# Patient Record
Sex: MALE | Race: WHITE | NOT HISPANIC OR LATINO | URBAN - METROPOLITAN AREA
[De-identification: names, ages, dates, MRNs, and addresses within clinical notes are randomized per-mention and may not be internally consistent; named-entity substitution may affect disease eponyms.]

---

## 2021-01-01 ENCOUNTER — EMERGENCY (EMERGENCY)
Facility: HOSPITAL | Age: 86
LOS: 1 days | Discharge: HOME | End: 2021-01-01
Attending: EMERGENCY MEDICINE | Admitting: EMERGENCY MEDICINE
Payer: MEDICARE

## 2021-01-01 VITALS
DIASTOLIC BLOOD PRESSURE: 56 MMHG | TEMPERATURE: 98 F | WEIGHT: 160.06 LBS | HEIGHT: 73 IN | RESPIRATION RATE: 18 BRPM | SYSTOLIC BLOOD PRESSURE: 90 MMHG | HEART RATE: 77 BPM

## 2021-01-01 VITALS
SYSTOLIC BLOOD PRESSURE: 109 MMHG | HEART RATE: 80 BPM | DIASTOLIC BLOOD PRESSURE: 66 MMHG | RESPIRATION RATE: 16 BRPM | OXYGEN SATURATION: 98 %

## 2021-01-01 DIAGNOSIS — Z90.49 ACQUIRED ABSENCE OF OTHER SPECIFIED PARTS OF DIGESTIVE TRACT: Chronic | ICD-10-CM

## 2021-01-01 DIAGNOSIS — Z90.89 ACQUIRED ABSENCE OF OTHER ORGANS: Chronic | ICD-10-CM

## 2021-01-01 PROCEDURE — 99285 EMERGENCY DEPT VISIT HI MDM: CPT

## 2021-01-01 PROCEDURE — 70450 CT HEAD/BRAIN W/O DYE: CPT | Mod: MG

## 2021-01-01 PROCEDURE — 71250 CT THORAX DX C-: CPT | Mod: 26,MF

## 2021-01-01 PROCEDURE — G1004: CPT

## 2021-01-01 PROCEDURE — 72125 CT NECK SPINE W/O DYE: CPT | Mod: 26,MG

## 2021-01-01 PROCEDURE — 71250 CT THORAX DX C-: CPT | Mod: MF

## 2021-01-01 PROCEDURE — 99284 EMERGENCY DEPT VISIT MOD MDM: CPT | Mod: 25

## 2021-01-01 PROCEDURE — 72125 CT NECK SPINE W/O DYE: CPT | Mod: MG

## 2021-01-01 PROCEDURE — 70450 CT HEAD/BRAIN W/O DYE: CPT | Mod: 26,MG

## 2021-01-01 RX ORDER — ACETAMINOPHEN 500 MG
650 TABLET ORAL ONCE
Refills: 0 | Status: COMPLETED | OUTPATIENT
Start: 2021-01-01 | End: 2021-01-01

## 2021-01-01 RX ADMIN — Medication 650 MILLIGRAM(S): at 16:44

## 2021-08-12 ENCOUNTER — EMERGENCY (EMERGENCY)
Facility: HOSPITAL | Age: 86
LOS: 1 days | Discharge: HOME | End: 2021-08-12
Attending: EMERGENCY MEDICINE | Admitting: EMERGENCY MEDICINE
Payer: MEDICARE

## 2021-08-12 VITALS
HEIGHT: 73 IN | DIASTOLIC BLOOD PRESSURE: 55 MMHG | WEIGHT: 119.93 LBS | OXYGEN SATURATION: 97 % | TEMPERATURE: 98 F | RESPIRATION RATE: 18 BRPM | HEART RATE: 70 BPM | SYSTOLIC BLOOD PRESSURE: 90 MMHG

## 2021-08-12 DIAGNOSIS — Z90.49 ACQUIRED ABSENCE OF OTHER SPECIFIED PARTS OF DIGESTIVE TRACT: Chronic | ICD-10-CM

## 2021-08-12 DIAGNOSIS — Z90.89 ACQUIRED ABSENCE OF OTHER ORGANS: Chronic | ICD-10-CM

## 2021-08-12 LAB
ALBUMIN SERPL ELPH-MCNC: 3.4 G/DL — SIGNIFICANT CHANGE UP (ref 3.3–5)
ALP SERPL-CCNC: 61 U/L — SIGNIFICANT CHANGE UP (ref 40–120)
ALT FLD-CCNC: 18 U/L — SIGNIFICANT CHANGE UP (ref 12–78)
ANION GAP SERPL CALC-SCNC: 4 MMOL/L — LOW (ref 5–17)
ANISOCYTOSIS BLD QL: SLIGHT — SIGNIFICANT CHANGE UP
APTT BLD: 28.1 SEC — SIGNIFICANT CHANGE UP (ref 27.5–35.5)
AST SERPL-CCNC: 12 U/L — LOW (ref 15–37)
BASO STIPL BLD QL SMEAR: PRESENT — SIGNIFICANT CHANGE UP
BASOPHILS # BLD AUTO: 0.06 K/UL — SIGNIFICANT CHANGE UP (ref 0–0.2)
BASOPHILS NFR BLD AUTO: 0.4 % — SIGNIFICANT CHANGE UP (ref 0–2)
BILIRUB SERPL-MCNC: 1.2 MG/DL — SIGNIFICANT CHANGE UP (ref 0.2–1.2)
BUN SERPL-MCNC: 32 MG/DL — HIGH (ref 7–23)
CALCIUM SERPL-MCNC: 8.4 MG/DL — LOW (ref 8.5–10.1)
CHLORIDE SERPL-SCNC: 110 MMOL/L — HIGH (ref 96–108)
CO2 SERPL-SCNC: 29 MMOL/L — SIGNIFICANT CHANGE UP (ref 22–31)
CREAT SERPL-MCNC: 1.4 MG/DL — HIGH (ref 0.5–1.3)
EOSINOPHIL # BLD AUTO: 0.23 K/UL — SIGNIFICANT CHANGE UP (ref 0–0.5)
EOSINOPHIL NFR BLD AUTO: 1.5 % — SIGNIFICANT CHANGE UP (ref 0–6)
GLUCOSE SERPL-MCNC: 87 MG/DL — SIGNIFICANT CHANGE UP (ref 70–99)
HCT VFR BLD CALC: 24.6 % — LOW (ref 39–50)
HGB BLD-MCNC: 8.3 G/DL — LOW (ref 13–17)
IMM GRANULOCYTES NFR BLD AUTO: 0.5 % — SIGNIFICANT CHANGE UP (ref 0–1.5)
INR BLD: 1.15 RATIO — SIGNIFICANT CHANGE UP (ref 0.88–1.16)
LACTATE SERPL-SCNC: 0.9 MMOL/L — SIGNIFICANT CHANGE UP (ref 0.7–2)
LIDOCAIN IGE QN: 107 U/L — SIGNIFICANT CHANGE UP (ref 73–393)
LYMPHOCYTES # BLD AUTO: 1.8 K/UL — SIGNIFICANT CHANGE UP (ref 1–3.3)
LYMPHOCYTES # BLD AUTO: 12.1 % — LOW (ref 13–44)
MACROCYTES BLD QL: SLIGHT — SIGNIFICANT CHANGE UP
MAGNESIUM SERPL-MCNC: 2.7 MG/DL — HIGH (ref 1.6–2.6)
MANUAL SMEAR VERIFICATION: SIGNIFICANT CHANGE UP
MCHC RBC-ENTMCNC: 33.7 GM/DL — SIGNIFICANT CHANGE UP (ref 32–36)
MCHC RBC-ENTMCNC: 36.9 PG — HIGH (ref 27–34)
MCV RBC AUTO: 109.3 FL — HIGH (ref 80–100)
MONOCYTES # BLD AUTO: 1.09 K/UL — HIGH (ref 0–0.9)
MONOCYTES NFR BLD AUTO: 7.3 % — SIGNIFICANT CHANGE UP (ref 2–14)
NEUTROPHILS # BLD AUTO: 11.64 K/UL — HIGH (ref 1.8–7.4)
NEUTROPHILS NFR BLD AUTO: 78.2 % — HIGH (ref 43–77)
NRBC # BLD: 0 /100 WBCS — SIGNIFICANT CHANGE UP (ref 0–0)
PLAT MORPH BLD: NORMAL — SIGNIFICANT CHANGE UP
PLATELET # BLD AUTO: 294 K/UL — SIGNIFICANT CHANGE UP (ref 150–400)
POTASSIUM SERPL-MCNC: 4.5 MMOL/L — SIGNIFICANT CHANGE UP (ref 3.5–5.3)
POTASSIUM SERPL-SCNC: 4.5 MMOL/L — SIGNIFICANT CHANGE UP (ref 3.5–5.3)
PROT SERPL-MCNC: 6.4 G/DL — SIGNIFICANT CHANGE UP (ref 6–8.3)
PROTHROM AB SERPL-ACNC: 13.4 SEC — SIGNIFICANT CHANGE UP (ref 10.6–13.6)
RBC # BLD: 2.25 M/UL — LOW (ref 4.2–5.8)
RBC # FLD: 15.1 % — HIGH (ref 10.3–14.5)
RBC BLD AUTO: ABNORMAL
ROULEAUX BLD QL SMEAR: PRESENT
SARS-COV-2 RNA SPEC QL NAA+PROBE: SIGNIFICANT CHANGE UP
SODIUM SERPL-SCNC: 143 MMOL/L — SIGNIFICANT CHANGE UP (ref 135–145)
WBC # BLD: 14.9 K/UL — HIGH (ref 3.8–10.5)
WBC # FLD AUTO: 14.9 K/UL — HIGH (ref 3.8–10.5)

## 2021-08-12 PROCEDURE — 74177 CT ABD & PELVIS W/CONTRAST: CPT | Mod: 26,MA

## 2021-08-12 PROCEDURE — 93010 ELECTROCARDIOGRAM REPORT: CPT

## 2021-08-12 PROCEDURE — 71045 X-RAY EXAM CHEST 1 VIEW: CPT | Mod: 26

## 2021-08-12 PROCEDURE — 99285 EMERGENCY DEPT VISIT HI MDM: CPT

## 2021-08-12 RX ORDER — FAMOTIDINE 10 MG/ML
20 INJECTION INTRAVENOUS ONCE
Refills: 0 | Status: COMPLETED | OUTPATIENT
Start: 2021-08-12 | End: 2021-08-12

## 2021-08-12 RX ORDER — ONDANSETRON 8 MG/1
4 TABLET, FILM COATED ORAL ONCE
Refills: 0 | Status: COMPLETED | OUTPATIENT
Start: 2021-08-12 | End: 2021-08-12

## 2021-08-12 RX ORDER — SODIUM CHLORIDE 9 MG/ML
1000 INJECTION INTRAMUSCULAR; INTRAVENOUS; SUBCUTANEOUS ONCE
Refills: 0 | Status: COMPLETED | OUTPATIENT
Start: 2021-08-12 | End: 2021-08-12

## 2021-08-12 RX ADMIN — SODIUM CHLORIDE 1000 MILLILITER(S): 9 INJECTION INTRAMUSCULAR; INTRAVENOUS; SUBCUTANEOUS at 22:50

## 2021-08-12 RX ADMIN — SODIUM CHLORIDE 1000 MILLILITER(S): 9 INJECTION INTRAMUSCULAR; INTRAVENOUS; SUBCUTANEOUS at 21:50

## 2021-08-12 RX ADMIN — FAMOTIDINE 20 MILLIGRAM(S): 10 INJECTION INTRAVENOUS at 21:50

## 2021-08-12 RX ADMIN — ONDANSETRON 4 MILLIGRAM(S): 8 TABLET, FILM COATED ORAL at 21:50

## 2021-08-12 NOTE — ED ADULT NURSE REASSESSMENT NOTE - NS ED NURSE REASSESS COMMENT FT1
Patient A/Ox4. NAD noted. Resting in stretcher. Call light in reach. Telemetry monitor on. C/o multiple vomiting episodes. Denies fever, diarrhea or hematemesis. Patient A/Ox4. NAD noted. Resting in stretcher. Call light in reach. Telemetry monitor on. C/o multiple vomiting episodes. Denies fever, diarrhea or hematemesis. Call light in reach. Patient A/Ox4. NAD noted. Resting in stretcher. Call light in reach. Telemetry monitor on. C/o multiple vomiting episodes. Denies fever, diarrhea or hematemesis. Call light in reach. Urine cup at bedside. Awaiting sample.

## 2021-08-12 NOTE — ED ADULT NURSE NOTE - OBJECTIVE STATEMENT
Pt received sitting on stretcher in NAD. Pt AOx3 abdominal pain today had vomited 3 times this week and had diarrhea today. Neuro WNL. PERRLA. Lungs CTA, RR even unlabored. Ab soft non tender, + bowel sounds x 4quads. Skin warm, dry, color appropriate for age and race.

## 2021-08-12 NOTE — ED ADULT NURSE NOTE - NSICDXPASTSURGICALHX_GEN_ALL_CORE_FT
PAST SURGICAL HISTORY:  History of appendectomy     History of cholecystectomy     History of tonsillectomy

## 2021-08-12 NOTE — ED ADULT TRIAGE NOTE - CHIEF COMPLAINT QUOTE
pt cant digest food he keeps vomiting (1 week intermittant)  diarrhea (intermittant)pt also with mucous in nose

## 2021-08-13 VITALS
RESPIRATION RATE: 18 BRPM | TEMPERATURE: 98 F | OXYGEN SATURATION: 99 % | HEART RATE: 75 BPM | DIASTOLIC BLOOD PRESSURE: 72 MMHG | SYSTOLIC BLOOD PRESSURE: 117 MMHG

## 2021-08-13 LAB
APPEARANCE UR: CLEAR — SIGNIFICANT CHANGE UP
BACTERIA # UR AUTO: ABNORMAL
BILIRUB UR-MCNC: NEGATIVE — SIGNIFICANT CHANGE UP
COD CRY URNS QL: ABNORMAL
COLOR SPEC: YELLOW — SIGNIFICANT CHANGE UP
COMMENT - URINE: SIGNIFICANT CHANGE UP
DIFF PNL FLD: NEGATIVE — SIGNIFICANT CHANGE UP
EPI CELLS # UR: SIGNIFICANT CHANGE UP
GLUCOSE UR QL: NEGATIVE — SIGNIFICANT CHANGE UP
HYALINE CASTS # UR AUTO: ABNORMAL
KETONES UR-MCNC: NEGATIVE — SIGNIFICANT CHANGE UP
LEUKOCYTE ESTERASE UR-ACNC: NEGATIVE — SIGNIFICANT CHANGE UP
NITRITE UR-MCNC: NEGATIVE — SIGNIFICANT CHANGE UP
PH UR: 5 — SIGNIFICANT CHANGE UP (ref 5–8)
PROT UR-MCNC: 15
RBC CASTS # UR COMP ASSIST: SIGNIFICANT CHANGE UP /HPF (ref 0–4)
SP GR SPEC: 1.01 — SIGNIFICANT CHANGE UP (ref 1.01–1.02)
UROBILINOGEN FLD QL: 1
WBC UR QL: SIGNIFICANT CHANGE UP

## 2021-08-13 PROCEDURE — 87635 SARS-COV-2 COVID-19 AMP PRB: CPT

## 2021-08-13 PROCEDURE — 74177 CT ABD & PELVIS W/CONTRAST: CPT | Mod: MA

## 2021-08-13 PROCEDURE — 87086 URINE CULTURE/COLONY COUNT: CPT

## 2021-08-13 PROCEDURE — 83735 ASSAY OF MAGNESIUM: CPT

## 2021-08-13 PROCEDURE — 96361 HYDRATE IV INFUSION ADD-ON: CPT

## 2021-08-13 PROCEDURE — 83690 ASSAY OF LIPASE: CPT

## 2021-08-13 PROCEDURE — 83605 ASSAY OF LACTIC ACID: CPT

## 2021-08-13 PROCEDURE — 80053 COMPREHEN METABOLIC PANEL: CPT

## 2021-08-13 PROCEDURE — 93005 ELECTROCARDIOGRAM TRACING: CPT

## 2021-08-13 PROCEDURE — 85730 THROMBOPLASTIN TIME PARTIAL: CPT

## 2021-08-13 PROCEDURE — 96375 TX/PRO/DX INJ NEW DRUG ADDON: CPT

## 2021-08-13 PROCEDURE — 81001 URINALYSIS AUTO W/SCOPE: CPT

## 2021-08-13 PROCEDURE — 36415 COLL VENOUS BLD VENIPUNCTURE: CPT

## 2021-08-13 PROCEDURE — 96365 THER/PROPH/DIAG IV INF INIT: CPT | Mod: XU

## 2021-08-13 PROCEDURE — 85610 PROTHROMBIN TIME: CPT

## 2021-08-13 PROCEDURE — 99284 EMERGENCY DEPT VISIT MOD MDM: CPT | Mod: 25

## 2021-08-13 PROCEDURE — 85025 COMPLETE CBC W/AUTO DIFF WBC: CPT

## 2021-08-13 PROCEDURE — 71045 X-RAY EXAM CHEST 1 VIEW: CPT

## 2021-08-13 RX ORDER — PIPERACILLIN AND TAZOBACTAM 4; .5 G/20ML; G/20ML
3.38 INJECTION, POWDER, LYOPHILIZED, FOR SOLUTION INTRAVENOUS ONCE
Refills: 0 | Status: COMPLETED | OUTPATIENT
Start: 2021-08-13 | End: 2021-08-13

## 2021-08-13 RX ORDER — SODIUM CHLORIDE 9 MG/ML
1000 INJECTION INTRAMUSCULAR; INTRAVENOUS; SUBCUTANEOUS ONCE
Refills: 0 | Status: COMPLETED | OUTPATIENT
Start: 2021-08-13 | End: 2021-08-13

## 2021-08-13 RX ADMIN — SODIUM CHLORIDE 1000 MILLILITER(S): 9 INJECTION INTRAMUSCULAR; INTRAVENOUS; SUBCUTANEOUS at 00:28

## 2021-08-13 RX ADMIN — PIPERACILLIN AND TAZOBACTAM 200 GRAM(S): 4; .5 INJECTION, POWDER, LYOPHILIZED, FOR SOLUTION INTRAVENOUS at 00:25

## 2021-08-13 RX ADMIN — PIPERACILLIN AND TAZOBACTAM 3.38 GRAM(S): 4; .5 INJECTION, POWDER, LYOPHILIZED, FOR SOLUTION INTRAVENOUS at 00:55

## 2021-08-13 RX ADMIN — SODIUM CHLORIDE 1000 MILLILITER(S): 9 INJECTION INTRAMUSCULAR; INTRAVENOUS; SUBCUTANEOUS at 01:28

## 2021-08-13 NOTE — ED PROVIDER NOTE - PROGRESS NOTE DETAILS
patient feels well, abdomen soft, non tender, wants to go home, spoke with daughter Sudhir [phone], labs, ct results discussed, patient currently in no pain, afebrile, to get antibiotics, dc home, f/u as outpatient next week with PMD, instructed to return to ER for fever, severe pain or worsning of symptoms, patient normally unable to ambulate, to call for ambulance for transfer home

## 2021-08-13 NOTE — ED PROVIDER NOTE - NSFOLLOWUPINSTRUCTIONS_ED_ALL_ED_FT
Follow up with your primary care doctor. Return to ER for severe pain, prolonged vomiting, fever > 100.4F or worsening of symptoms. Start a liquid diet for the next 24 hours.    WHAT YOU NEED TO KNOW:    A clear liquid diet is made up of clear liquids and foods that are liquid at room temperature. The clear liquid diet provides liquids, sugar, salt, and some nutrients until you can eat solid food. The clear liquid diet does not provide all the nutrients, vitamins, minerals, or calories that your body needs. Your healthcare provider will tell you when you can start to eat regular foods.     DISCHARGE INSTRUCTIONS:    Liquids and foods to include:   •Clear juices (such as apple, cranberry, or grape), strained citrus juices or fruit punch      •Coffee or tea (without cream or milk)      •Water      •Clear sports drinks or soft drinks, such as ginger ale, lemon-lime soda, or club soda (no cola or root beer)      •Clear broth, bouillon, or consommé      •Plain popsicles (no popsicles with pureed fruit or fiber)      •Hard candy      •Flavored gelatin without fruit      Liquids and foods to avoid:   •All solid foods, such as bread, pasta, fruit, vegetables, dairy, and protein foods      •Beverages containing alcohol      •Dairy products, such as milk, hot cocoa, buttermilk, and cream      •Fruit smoothies, nectars, fruit juices with pulp, and prune juice      •Tomato and vegetable juices      •Soups that contain vegetables, noodles, rice, or meat      Sample menu for a clear liquid diet:   •Breakfast: 1 cup of juice, ¾ cup of clear broth, ½ cup of lemon gelatin, and 1 cup of coffee with sugar      •Morning snack: 1 cup of a clear sports drink      •Lunch: ½ cup of juice, ¾ cup of clear broth, ¾ cup of lemon-lime soda, and 1 popsicle (equals about 2 ounces of liquid)      •Afternoon snack: 1 popsicle       •Evening meal: ½ cup of juice, ¾ cup of clear broth, ¾ cup of ginger ale, ½ cup of flavored gelatin, and 1 cup of herbal tea with honey or sugar      •Evening snack: 1 cup of flavored gelatin      Risks: The clear liquid diet does not provide all the nutrients you need. You may need to drink a nutrition supplement if you have to follow this diet for more than 3 days. If you do not follow this diet, you may continue to have diarrhea, nausea, and vomiting if you were asked to follow this diet because of these problems.        © Copyright [employer] 2021           back to top                          © Copyright [employer] 2021

## 2021-08-13 NOTE — ED PROVIDER NOTE - OBJECTIVE STATEMENT
89 male presents to ER with daughter states for the past week he has had intermitant vomiting, today he had an episode of loose stool and abdominal pain, patient states he currently feels well.

## 2021-08-13 NOTE — ED PROVIDER NOTE - CARE PROVIDER_API CALL
Jamar Parkinson (MD)  Family Medicine  [address] [place] [address]  Phone: [phone]  Fax: [phone]  Follow Up Time:

## 2021-08-13 NOTE — ED PROVIDER NOTE - PATIENT PORTAL LINK FT
You can access the FollowMyHealth Patient Portal offered by [facility] by registering at the following website: [email or web address]. By joining [employer]’s FollowMyHealth portal, you will also be able to view your health information using other applications (apps) compatible with our system.

## 2021-08-14 LAB
CULTURE RESULTS: SIGNIFICANT CHANGE UP
SPECIMEN SOURCE: SIGNIFICANT CHANGE UP

## 2021-10-04 ENCOUNTER — EMERGENCY (EMERGENCY)
Facility: HOSPITAL | Age: 86
LOS: 1 days | Discharge: HOME | End: 2021-10-04
Attending: EMERGENCY MEDICINE | Admitting: EMERGENCY MEDICINE
Payer: MEDICARE

## 2021-10-04 VITALS
OXYGEN SATURATION: 100 % | HEIGHT: 73 IN | SYSTOLIC BLOOD PRESSURE: 98 MMHG | DIASTOLIC BLOOD PRESSURE: 58 MMHG | RESPIRATION RATE: 12 BRPM | WEIGHT: 132.94 LBS | HEART RATE: 67 BPM | TEMPERATURE: 97 F

## 2021-10-04 VITALS
SYSTOLIC BLOOD PRESSURE: 100 MMHG | DIASTOLIC BLOOD PRESSURE: 58 MMHG | HEART RATE: 66 BPM | RESPIRATION RATE: 16 BRPM | TEMPERATURE: 97 F | OXYGEN SATURATION: 100 %

## 2021-10-04 DIAGNOSIS — Z90.49 ACQUIRED ABSENCE OF OTHER SPECIFIED PARTS OF DIGESTIVE TRACT: Chronic | ICD-10-CM

## 2021-10-04 DIAGNOSIS — Z90.89 ACQUIRED ABSENCE OF OTHER ORGANS: Chronic | ICD-10-CM

## 2021-10-04 LAB
ALBUMIN SERPL ELPH-MCNC: 3.3 G/DL — SIGNIFICANT CHANGE UP (ref 3.3–5)
ALP SERPL-CCNC: 71 U/L — SIGNIFICANT CHANGE UP (ref 40–120)
ALT FLD-CCNC: 25 U/L — SIGNIFICANT CHANGE UP (ref 12–78)
ANION GAP SERPL CALC-SCNC: 4 MMOL/L — LOW (ref 5–17)
ANISOCYTOSIS BLD QL: SLIGHT — SIGNIFICANT CHANGE UP
APTT BLD: 29.7 SEC — SIGNIFICANT CHANGE UP (ref 27.5–35.5)
AST SERPL-CCNC: 23 U/L — SIGNIFICANT CHANGE UP (ref 15–37)
BASOPHILS # BLD AUTO: 0.06 K/UL — SIGNIFICANT CHANGE UP (ref 0–0.2)
BASOPHILS NFR BLD AUTO: 0.8 % — SIGNIFICANT CHANGE UP (ref 0–2)
BILIRUB SERPL-MCNC: 1.1 MG/DL — SIGNIFICANT CHANGE UP (ref 0.2–1.2)
BUN SERPL-MCNC: 29 MG/DL — HIGH (ref 7–23)
CALCIUM SERPL-MCNC: 8.3 MG/DL — LOW (ref 8.5–10.1)
CHLORIDE SERPL-SCNC: 110 MMOL/L — HIGH (ref 96–108)
CK SERPL-CCNC: 44 U/L — SIGNIFICANT CHANGE UP (ref 26–308)
CO2 SERPL-SCNC: 28 MMOL/L — SIGNIFICANT CHANGE UP (ref 22–31)
CREAT SERPL-MCNC: 1.5 MG/DL — HIGH (ref 0.5–1.3)
EOSINOPHIL # BLD AUTO: 0.25 K/UL — SIGNIFICANT CHANGE UP (ref 0–0.5)
EOSINOPHIL NFR BLD AUTO: 3.2 % — SIGNIFICANT CHANGE UP (ref 0–6)
GLUCOSE SERPL-MCNC: 91 MG/DL — SIGNIFICANT CHANGE UP (ref 70–99)
HCT VFR BLD CALC: 24.9 % — LOW (ref 39–50)
HGB BLD-MCNC: 8.1 G/DL — LOW (ref 13–17)
IMM GRANULOCYTES NFR BLD AUTO: 0.3 % — SIGNIFICANT CHANGE UP (ref 0–1.5)
INR BLD: 1.11 RATIO — SIGNIFICANT CHANGE UP (ref 0.88–1.16)
LACTATE SERPL-SCNC: 1.2 MMOL/L — SIGNIFICANT CHANGE UP (ref 0.7–2)
LIDOCAIN IGE QN: 150 U/L — SIGNIFICANT CHANGE UP (ref 73–393)
LYMPHOCYTES # BLD AUTO: 1.73 K/UL — SIGNIFICANT CHANGE UP (ref 1–3.3)
LYMPHOCYTES # BLD AUTO: 22.2 % — SIGNIFICANT CHANGE UP (ref 13–44)
MACROCYTES BLD QL: SLIGHT — SIGNIFICANT CHANGE UP
MANUAL SMEAR VERIFICATION: SIGNIFICANT CHANGE UP
MCHC RBC-ENTMCNC: 32.5 GM/DL — SIGNIFICANT CHANGE UP (ref 32–36)
MCHC RBC-ENTMCNC: 36.8 PG — HIGH (ref 27–34)
MCV RBC AUTO: 113.2 FL — HIGH (ref 80–100)
MONOCYTES # BLD AUTO: 0.92 K/UL — HIGH (ref 0–0.9)
MONOCYTES NFR BLD AUTO: 11.8 % — SIGNIFICANT CHANGE UP (ref 2–14)
NEUTROPHILS # BLD AUTO: 4.83 K/UL — SIGNIFICANT CHANGE UP (ref 1.8–7.4)
NEUTROPHILS NFR BLD AUTO: 61.7 % — SIGNIFICANT CHANGE UP (ref 43–77)
NRBC # BLD: 0 /100 WBCS — SIGNIFICANT CHANGE UP (ref 0–0)
PLAT MORPH BLD: NORMAL — SIGNIFICANT CHANGE UP
PLATELET # BLD AUTO: 281 K/UL — SIGNIFICANT CHANGE UP (ref 150–400)
POTASSIUM SERPL-MCNC: 4.5 MMOL/L — SIGNIFICANT CHANGE UP (ref 3.5–5.3)
POTASSIUM SERPL-SCNC: 4.5 MMOL/L — SIGNIFICANT CHANGE UP (ref 3.5–5.3)
PROT SERPL-MCNC: 6.8 G/DL — SIGNIFICANT CHANGE UP (ref 6–8.3)
PROTHROM AB SERPL-ACNC: 12.9 SEC — SIGNIFICANT CHANGE UP (ref 10.6–13.6)
RBC # BLD: 2.2 M/UL — LOW (ref 4.2–5.8)
RBC # FLD: 15.6 % — HIGH (ref 10.3–14.5)
RBC BLD AUTO: ABNORMAL
SARS-COV-2 RNA SPEC QL NAA+PROBE: SIGNIFICANT CHANGE UP
SODIUM SERPL-SCNC: 142 MMOL/L — SIGNIFICANT CHANGE UP (ref 135–145)
TROPONIN I SERPL-MCNC: <.015 NG/ML — SIGNIFICANT CHANGE UP (ref 0.01–0.04)
WBC # BLD: 7.81 K/UL — SIGNIFICANT CHANGE UP (ref 3.8–10.5)
WBC # FLD AUTO: 7.81 K/UL — SIGNIFICANT CHANGE UP (ref 3.8–10.5)

## 2021-10-04 PROCEDURE — 70450 CT HEAD/BRAIN W/O DYE: CPT | Mod: MA

## 2021-10-04 PROCEDURE — 93005 ELECTROCARDIOGRAM TRACING: CPT

## 2021-10-04 PROCEDURE — 83690 ASSAY OF LIPASE: CPT

## 2021-10-04 PROCEDURE — 83605 ASSAY OF LACTIC ACID: CPT

## 2021-10-04 PROCEDURE — 72170 X-RAY EXAM OF PELVIS: CPT

## 2021-10-04 PROCEDURE — 85730 THROMBOPLASTIN TIME PARTIAL: CPT

## 2021-10-04 PROCEDURE — 71260 CT THORAX DX C+: CPT | Mod: 26,MA

## 2021-10-04 PROCEDURE — 84484 ASSAY OF TROPONIN QUANT: CPT

## 2021-10-04 PROCEDURE — 71260 CT THORAX DX C+: CPT | Mod: MA

## 2021-10-04 PROCEDURE — 82550 ASSAY OF CK (CPK): CPT

## 2021-10-04 PROCEDURE — 36415 COLL VENOUS BLD VENIPUNCTURE: CPT

## 2021-10-04 PROCEDURE — 71045 X-RAY EXAM CHEST 1 VIEW: CPT | Mod: 26

## 2021-10-04 PROCEDURE — 72125 CT NECK SPINE W/O DYE: CPT | Mod: 26,MA

## 2021-10-04 PROCEDURE — 71045 X-RAY EXAM CHEST 1 VIEW: CPT

## 2021-10-04 PROCEDURE — 85610 PROTHROMBIN TIME: CPT

## 2021-10-04 PROCEDURE — 99284 EMERGENCY DEPT VISIT MOD MDM: CPT | Mod: 25

## 2021-10-04 PROCEDURE — 72170 X-RAY EXAM OF PELVIS: CPT | Mod: 26

## 2021-10-04 PROCEDURE — 85025 COMPLETE CBC W/AUTO DIFF WBC: CPT

## 2021-10-04 PROCEDURE — 72125 CT NECK SPINE W/O DYE: CPT | Mod: MA

## 2021-10-04 PROCEDURE — U0003: CPT

## 2021-10-04 PROCEDURE — U0005: CPT

## 2021-10-04 PROCEDURE — 74177 CT ABD & PELVIS W/CONTRAST: CPT | Mod: 26,MA

## 2021-10-04 PROCEDURE — 93010 ELECTROCARDIOGRAM REPORT: CPT

## 2021-10-04 PROCEDURE — 74177 CT ABD & PELVIS W/CONTRAST: CPT | Mod: MA

## 2021-10-04 PROCEDURE — 99285 EMERGENCY DEPT VISIT HI MDM: CPT

## 2021-10-04 PROCEDURE — 80053 COMPREHEN METABOLIC PANEL: CPT

## 2021-10-04 PROCEDURE — 70450 CT HEAD/BRAIN W/O DYE: CPT | Mod: 26,MA

## 2021-10-04 RX ORDER — SODIUM CHLORIDE 9 MG/ML
1000 INJECTION INTRAMUSCULAR; INTRAVENOUS; SUBCUTANEOUS ONCE
Refills: 0 | Status: DISCONTINUED | OUTPATIENT
Start: 2021-10-04 | End: 2021-10-08

## 2021-10-04 NOTE — ED ADULT TRIAGE NOTE - CHIEF COMPLAINT QUOTE
As per daughter pt fell down the steps, split level yesterday, Sunday afternoon. Bruising on both arms. Pt sates he lost his balannce

## 2021-10-04 NOTE — ED ADULT NURSE NOTE - NSIMPLEMENTINTERV_GEN_ALL_ED
Implemented All Fall with Harm Risk Interventions:  [place] to call system. Call bell, personal items and telephone within reach. Instruct patient to call for assistance. Room bathroom lighting operational. Non-slip footwear when patient is off stretcher. Physically safe environment: no spills, clutter or unnecessary equipment. Stretcher in lowest position, wheels locked, appropriate side rails in place. Provide visual cue, wrist band, yellow gown, etc. Monitor gait and stability. Monitor for mental status changes and reorient to person, place, and time. Review medications for side effects contributing to fall risk. Reinforce activity limits and safety measures with patient and family. Provide visual clues: red socks.

## 2021-10-04 NOTE — ED PROVIDER NOTE - NSFOLLOWUPINSTRUCTIONS_ED_ALL_ED_FT
1) Follow-up with your Primary Medical Doctor. Call today / next business day for prompt follow-up.  2) Return to Emergency room for any worsening or persistent pain, shortness of breath, chest pains, abdominal pain, numbness, tingling, headaches, vomiting, visual changes, dizziness, weakness, fever, if you are having difficulty getting around or you feel unsteady, or if you have any other concerning symptoms.  3) See attached instruction sheets for additional information, including information regarding signs and symptoms to look out for, reasons to seek immediate care and other important instructions.  4) Make sure to take your time with walking, especially when first standing up.   5) Incentive spirometer ten times / hour while awake

## 2021-10-04 NOTE — ED PROVIDER NOTE - PATIENT PORTAL LINK FT
You can access the FollowMyHealth Patient Portal offered by [facility] by registering at the following website: [email or web address]. By joining [employer]’s FollowMyHealth portal, you will also be able to view your health information using other applications (apps) compatible with our system.

## 2021-10-04 NOTE — ED PROVIDER NOTE - OBJECTIVE STATEMENT
89 yo M p/w sp fall on ~ 2 - 3 steps onto landing yesterday ~ 3pm. No head inj / loc. no HA/n/v/dizzy. no neck / back pain. pt co L rib / flank pain / upper abd pain. no weakness / dizziness. no neck / back pain. no fever/chills. No agg/allev factors. no other inj or co.

## 2021-10-04 NOTE — ED PROVIDER NOTE - CARE PLAN
1 Principal Discharge DX:	Fracture of one rib of left side  Secondary Diagnosis:	Fall down stairs, initial encounter

## 2021-10-04 NOTE — ED PROVIDER NOTE - CARE PROVIDER_API CALL
Jamar Parkinson (MD)  Family Medicine  [address] [place] [address]  Phone: [phone]  Fax: [phone]  Follow Up Time:

## 2021-10-04 NOTE — ED PROVIDER NOTE - PROGRESS NOTE DETAILS
Pt doing well, no acute co or changes. No acute intraabd trauma. Pt feeling well, 1 rib fx noted. Called family, left message. Dw pts daughter re all findings, discussed admission. She wants to take pt home, will fu with PMD asap and will return with any worsening / persistent pain, fever, or any other concerns,.

## 2021-10-19 PROBLEM — I95.9 HYPOTENSION, UNSPECIFIED: Chronic | Status: ACTIVE | Noted: 2021-10-04

## 2021-10-19 NOTE — ED PROVIDER NOTE - CARE PLAN
1 Principal Discharge DX:	Fall at home  Secondary Diagnosis:	CHI (closed head injury), initial encounter

## 2021-10-19 NOTE — ED PROVIDER NOTE - NSFOLLOWUPINSTRUCTIONS_ED_ALL_ED_FT
Closed Head Injury    A closed head injury is an injury to your head that may or may not involve a traumatic brain injury (TBI). Symptoms of TBI can be short or long lasting and include headache, dizziness, interference with memory or speech, fatigue, confusion, changes in sleep, mood changes, nausea, depression/anxiety, and dulling of senses. Make sure to obtain proper rest which includes getting plenty of sleep, avoiding excessive visual stimulation, and avoiding activities that may cause physical or mental stress. Avoid any situation where there is potential for another head injury, including sports.    SEEK IMMEDIATE MEDICAL CARE IF YOU HAVE ANY OF THE FOLLOWING SYMPTOMS: unusual drowsiness, vomiting, severe dizziness, seizures, lightheadedness, muscular weakness, different pupil sizes, visual changes, or clear or bloody discharge from your ears or nose.     Fall Prevention in the Home, Adult  Falls can cause injuries and can affect people from all age groups. There are many simple things that you can do to make your home safe and to help prevent falls. Ask for help when making these changes, if needed.    What actions can I take to prevent falls?  General instructions     Use good lighting in all rooms. Replace any light bulbs that burn out.  Turn on lights if it is dark. Use night-lights.  Place frequently used items in easy-to-reach places. Lower the shelves around your home if necessary.  Set up furniture so that there are clear paths around it. Avoid moving your furniture around.  Remove throw rugs and other tripping hazards from the floor.  Avoid walking on wet floors.  Fix any uneven floor surfaces.  Add color or contrast paint or tape to grab bars and handrails in your home. Place contrasting color strips on the first and last steps of stairways.  When you use a stepladder, make sure that it is completely opened and that the sides are firmly locked. Have someone hold the ladder while you are using it. Do not climb a closed stepladder.  Be aware of any and all pets.  What can I do in the bathroom?     Keep the floor dry. Immediately clean up any water that spills onto the floor.  Remove soap buildup in the tub or shower on a regular basis.  Use non-skid mats or decals on the floor of the tub or shower.  Attach bath mats securely with double-sided, non-slip rug tape.  If you need to sit down while you are in the shower, use a plastic, non-slip stool.  Image ImageInstall grab bars by the toilet and in the tub and shower. Do not use towel bars as grab bars.  What can I do in the bedroom?     Make sure that a bedside light is easy to reach.  Do not use oversized bedding that drapes onto the floor.  Have a firm chair that has side arms to use for getting dressed.  What can I do in the kitchen?     Clean up any spills right away.  If you need to reach for something above you, use a sturdy step stool that has a grab bar.  Keep electrical cables out of the way.  Do not use floor polish or wax that makes floors slippery. If you must use wax, make sure that it is non-skid floor wax.  What can I do in the stairways?     Do not leave any items on the stairs.  Make sure that you have a light switch at the top of the stairs and the bottom of the stairs. Have them installed if you do not have them.  Make sure that there are handrails on both sides of the stairs. Fix handrails that are broken or loose. Make sure that handrails are as long as the stairways.  Install non-slip stair treads on all stairs in your home.  Avoid having throw rugs at the top or bottom of stairways, or secure the rugs with carpet tape to prevent them from moving.  Choose a carpet design that does not hide the edge of steps on the stairway.  Check any carpeting to make sure that it is firmly attached to the stairs. Fix any carpet that is loose or worn.  What can I do on the outside of my home?     Use bright outdoor lighting.  Regularly repair the edges of walkways and driveways and fix any cracks.  Remove high doorway thresholds.  Trim any shrubbery on the main path into your home.  Regularly check that handrails are securely fastened and in good repair. Both sides of any steps should have handrails.  Install guardrails along the edges of any raised decks or porches.  Clear walkways of debris and clutter, including tools and rocks.  Have leaves, snow, and ice cleared regularly.  Use sand or salt on walkways during winter months.  In the garage, clean up any spills right away, including grease or oil spills.  What other actions can I take?     Wear closed-toe shoes that fit well and support your feet. Wear shoes that have rubber soles or low heels.  Use mobility aids as needed, such as canes, walkers, scooters, and crutches.  Review your medicines with your health care provider. Some medicines can cause dizziness or changes in blood pressure, which increase your risk of falling.  Talk with your health care provider about other ways that you can decrease your risk of falls. This may include working with a physical therapist or  to improve your strength, balance, and endurance.    Where to find more information  Centers for Disease Control and PreventionJANNIE: https://www.cdc.gov  [place] [employer] [place]: https://yo5heko.makayla.nih.gov  Contact a health care provider if:  You are afraid of falling at home.  You feel weak, drowsy, or dizzy at home.  You fall at home.  Summary  There are many simple things that you can do to make your home safe and to help prevent falls.  Ways to make your home safe include removing tripping hazards and installing grab bars in the bathroom.  Ask for help when making these changes in your home.  This information is not intended to replace advice given to you by your health care provider. Make sure you discuss any questions you have with your health care provider.

## 2021-10-19 NOTE — ED PROVIDER NOTE - ATTENDING CONTRIBUTION TO CARE
pt bib family and aide s/p fall last night. per family and aide, pt had neck pain and left rib pain at site near recent ribs fx. pt denies ha, n/v sob, abd pain, arm or leg pain, back pain, weakness, numbness. family and aide relate pt has had dizziness x years, unchanged at this time  chronically ill male, nad, perrl, eomi, mmm, s1s2 rrr lungs cta, ribs nt, abd soft, nt, no cvat, ext nt, full rom, neuro no motor or sensory deficits

## 2021-10-19 NOTE — ED PROVIDER NOTE - CARE PROVIDER_API CALL
Jamar Parkinson (MD)  Family Medicine  [address] [place] [address]  Phone: [phone]  Fax: [phone]  Follow Up Time: 1-3 Days

## 2021-10-19 NOTE — ED PROVIDER NOTE - OBJECTIVE STATEMENT
89 yo hypotension presents to the ED s/p mechanical fall last night around 12 am when patient got up to use the bathroom. + head trauma, no LOC, no blood thinners. Patient is chronic dizziness x several years, unchanged today. Patient with recent fall 2 weeks ago resulting in 1 left rib fx. Patient currently c/o head, neck and left chest wall pain. Patient ambulating with walker at baseline since fall. Denies fever, chills, chest pain, sob, abd pain, N/V, UE/LE weakness or paresthesias, urinary sxs, flank pain. no abrasions or lacerations.     pmd Dr. Quiroz

## 2021-10-19 NOTE — ED PROVIDER NOTE - PATIENT PORTAL LINK FT
You can access the FollowMyHealth Patient Portal offered by [facility] by registering at the following website: [email or web address]. By joining [employer]’s FollowMyHealth portal, you will also be able to view your health information using other applications (apps) compatible with our system.

## 2021-10-19 NOTE — ED PROVIDER NOTE - CLINICAL SUMMARY MEDICAL DECISION MAKING FREE TEXT BOX
89 yo hypotension presents to the ED s/p mechanical fall last night around 12 am when patient got up to use the bathroom. + head trauma, no LOC, no blood thinners. Patient is chronic dizziness x several years, unchanged today. Patient with recent fall 2 weeks ago resulting in 1 left rib fx. Patient currently c/o head, neck and left chest wall pain. Patient ambulating with walker at baseline since fall. Denies fever, chills, chest pain, sob, abd pain, N/V, UE/LE weakness or paresthesias, urinary sxs, flank pain. no abrasions or lacerations. PE: as above. A/P: ct head, neck, chest, reassess.

## 2021-10-19 NOTE — ED PROVIDER NOTE - PROGRESS NOTE DETAILS
Reevaluated patient at bedside.  Patient feeling much improved.  Discussed the results of all diagnostic testing in ED and copies of all available reports given.   An opportunity to ask questions was provided.  Discussed the importance of prompt, close medical follow-up.  Patient will return with any changes, concerns or persistent/worsening symptoms.  Understanding of all instructions verbalized. 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

## 2021-10-19 NOTE — ED ADULT TRIAGE NOTE - CHIEF COMPLAINT QUOTE
patient came in ED from home s/p fall at around 12 midnight with c/o pain to the neck, left side of the body, lower back. caregiver stated his head hit the towel rack. denies LOC.

## 2021-10-19 NOTE — ED ADULT NURSE NOTE - NSIMPLEMENTINTERV_GEN_ALL_ED
Implemented All Fall with Harm Risk Interventions:  [place] to call system. Call bell, personal items and telephone within reach. Instruct patient to call for assistance. Room bathroom lighting operational. Non-slip footwear when patient is off stretcher. Physically safe environment: no spills, clutter or unnecessary equipment. Stretcher in lowest position, wheels locked, appropriate side rails in place. Provide visual cue, wrist band, yellow gown, etc. Monitor gait and stability. Monitor for mental status changes and reorient to person, place, and time. Review medications for side effects contributing to fall risk. Reinforce activity limits and safety measures with patient and family. Provide visual clues: red socks.

## 2022-01-01 ENCOUNTER — INPATIENT (INPATIENT)
Facility: HOSPITAL | Age: 87
LOS: 1 days | DRG: 871 | End: 2022-10-09
Attending: STUDENT IN AN ORGANIZED HEALTH CARE EDUCATION/TRAINING PROGRAM | Admitting: STUDENT IN AN ORGANIZED HEALTH CARE EDUCATION/TRAINING PROGRAM
Payer: MEDICARE

## 2022-01-01 VITALS
HEART RATE: 89 BPM | SYSTOLIC BLOOD PRESSURE: 105 MMHG | DIASTOLIC BLOOD PRESSURE: 58 MMHG | RESPIRATION RATE: 19 BRPM | OXYGEN SATURATION: 99 %

## 2022-01-01 VITALS
HEIGHT: 73 IN | HEART RATE: 114 BPM | SYSTOLIC BLOOD PRESSURE: 77 MMHG | OXYGEN SATURATION: 88 % | RESPIRATION RATE: 22 BRPM | DIASTOLIC BLOOD PRESSURE: 55 MMHG

## 2022-01-01 DIAGNOSIS — A41.9 SEPSIS, UNSPECIFIED ORGANISM: ICD-10-CM

## 2022-01-01 DIAGNOSIS — R74.01 ELEVATION OF LEVELS OF LIVER TRANSAMINASE LEVELS: ICD-10-CM

## 2022-01-01 DIAGNOSIS — Z29.9 ENCOUNTER FOR PROPHYLACTIC MEASURES, UNSPECIFIED: ICD-10-CM

## 2022-01-01 DIAGNOSIS — J96.01 ACUTE RESPIRATORY FAILURE WITH HYPOXIA: ICD-10-CM

## 2022-01-01 DIAGNOSIS — Z71.89 OTHER SPECIFIED COUNSELING: ICD-10-CM

## 2022-01-01 DIAGNOSIS — D64.9 ANEMIA, UNSPECIFIED: ICD-10-CM

## 2022-01-01 DIAGNOSIS — Z90.49 ACQUIRED ABSENCE OF OTHER SPECIFIED PARTS OF DIGESTIVE TRACT: Chronic | ICD-10-CM

## 2022-01-01 DIAGNOSIS — Z90.89 ACQUIRED ABSENCE OF OTHER ORGANS: Chronic | ICD-10-CM

## 2022-01-01 DIAGNOSIS — R79.89 OTHER SPECIFIED ABNORMAL FINDINGS OF BLOOD CHEMISTRY: ICD-10-CM

## 2022-01-01 DIAGNOSIS — R41.82 ALTERED MENTAL STATUS, UNSPECIFIED: ICD-10-CM

## 2022-01-01 DIAGNOSIS — R41.89 OTHER SYMPTOMS AND SIGNS INVOLVING COGNITIVE FUNCTIONS AND AWARENESS: ICD-10-CM

## 2022-01-01 DIAGNOSIS — N17.9 ACUTE KIDNEY FAILURE, UNSPECIFIED: ICD-10-CM

## 2022-01-01 DIAGNOSIS — R77.8 OTHER SPECIFIED ABNORMALITIES OF PLASMA PROTEINS: ICD-10-CM

## 2022-01-01 LAB
ABO RH CONFIRMATION: SIGNIFICANT CHANGE UP
ALBUMIN SERPL ELPH-MCNC: 2.7 G/DL — LOW (ref 3.3–5)
ALBUMIN SERPL ELPH-MCNC: 2.8 G/DL — LOW (ref 3.3–5)
ALP SERPL-CCNC: 61 U/L — SIGNIFICANT CHANGE UP (ref 40–120)
ALP SERPL-CCNC: 63 U/L — SIGNIFICANT CHANGE UP (ref 40–120)
ALT FLD-CCNC: 103 U/L — HIGH (ref 12–78)
ALT FLD-CCNC: 103 U/L — HIGH (ref 12–78)
ANION GAP SERPL CALC-SCNC: 16 MMOL/L — SIGNIFICANT CHANGE UP (ref 5–17)
ANION GAP SERPL CALC-SCNC: 8 MMOL/L — SIGNIFICANT CHANGE UP (ref 5–17)
ANISOCYTOSIS BLD QL: SLIGHT — SIGNIFICANT CHANGE UP
APTT BLD: 31 SEC — SIGNIFICANT CHANGE UP (ref 27.5–35.5)
AST SERPL-CCNC: 120 U/L — HIGH (ref 15–37)
AST SERPL-CCNC: 89 U/L — HIGH (ref 15–37)
BASE EXCESS BLDV CALC-SCNC: -2.3 MMOL/L — LOW (ref -2–3)
BASOPHILS # BLD AUTO: 0 K/UL — SIGNIFICANT CHANGE UP (ref 0–0.2)
BASOPHILS # BLD AUTO: 0 K/UL — SIGNIFICANT CHANGE UP (ref 0–0.2)
BASOPHILS NFR BLD AUTO: 0 % — SIGNIFICANT CHANGE UP (ref 0–2)
BASOPHILS NFR BLD AUTO: 0 % — SIGNIFICANT CHANGE UP (ref 0–2)
BILIRUB SERPL-MCNC: 1.4 MG/DL — HIGH (ref 0.2–1.2)
BILIRUB SERPL-MCNC: 1.8 MG/DL — HIGH (ref 0.2–1.2)
BLD GP AB SCN SERPL QL: SIGNIFICANT CHANGE UP
BLOOD GAS COMMENTS, VENOUS: SIGNIFICANT CHANGE UP
BUN SERPL-MCNC: 58 MG/DL — HIGH (ref 7–23)
BUN SERPL-MCNC: 72 MG/DL — HIGH (ref 7–23)
BURR CELLS BLD QL SMEAR: PRESENT — SIGNIFICANT CHANGE UP
CALCIUM SERPL-MCNC: 9 MG/DL — SIGNIFICANT CHANGE UP (ref 8.5–10.1)
CALCIUM SERPL-MCNC: 9.5 MG/DL — SIGNIFICANT CHANGE UP (ref 8.5–10.1)
CHLORIDE SERPL-SCNC: 111 MMOL/L — HIGH (ref 96–108)
CHLORIDE SERPL-SCNC: 113 MMOL/L — HIGH (ref 96–108)
CK SERPL-CCNC: 43 U/L — SIGNIFICANT CHANGE UP (ref 26–308)
CO2 SERPL-SCNC: 20 MMOL/L — LOW (ref 22–31)
CO2 SERPL-SCNC: 29 MMOL/L — SIGNIFICANT CHANGE UP (ref 22–31)
CREAT SERPL-MCNC: 3.1 MG/DL — HIGH (ref 0.5–1.3)
CREAT SERPL-MCNC: 3.1 MG/DL — HIGH (ref 0.5–1.3)
EGFR: 18 ML/MIN/1.73M2 — LOW
EGFR: 18 ML/MIN/1.73M2 — LOW
ELLIPTOCYTES BLD QL SMEAR: SLIGHT — SIGNIFICANT CHANGE UP
EOSINOPHIL # BLD AUTO: 0 K/UL — SIGNIFICANT CHANGE UP (ref 0–0.5)
EOSINOPHIL # BLD AUTO: 0 K/UL — SIGNIFICANT CHANGE UP (ref 0–0.5)
EOSINOPHIL NFR BLD AUTO: 0 % — SIGNIFICANT CHANGE UP (ref 0–6)
EOSINOPHIL NFR BLD AUTO: 0 % — SIGNIFICANT CHANGE UP (ref 0–6)
FOLATE SERPL-MCNC: 8.9 NG/ML — SIGNIFICANT CHANGE UP
GAS PNL BLDV: SIGNIFICANT CHANGE UP
GLUCOSE SERPL-MCNC: 5 MG/DL — CRITICAL LOW (ref 70–99)
GLUCOSE SERPL-MCNC: 99 MG/DL — SIGNIFICANT CHANGE UP (ref 70–99)
HCO3 BLDV-SCNC: 24 MMOL/L — SIGNIFICANT CHANGE UP (ref 22–29)
HCT VFR BLD CALC: 17.6 % — CRITICAL LOW (ref 39–50)
HCT VFR BLD CALC: 30.3 % — LOW (ref 39–50)
HGB BLD-MCNC: 5.3 G/DL — CRITICAL LOW (ref 13–17)
HGB BLD-MCNC: 9.6 G/DL — LOW (ref 13–17)
INR BLD: 1.42 RATIO — HIGH (ref 0.88–1.16)
IRON SATN MFR SERPL: 12 % — LOW (ref 16–55)
IRON SATN MFR SERPL: 16 UG/DL — LOW (ref 45–165)
LACTATE SERPL-SCNC: 6.2 MMOL/L — CRITICAL HIGH (ref 0.7–2)
LYMPHOCYTES # BLD AUTO: 0.23 K/UL — LOW (ref 1–3.3)
LYMPHOCYTES # BLD AUTO: 1 % — LOW (ref 13–44)
LYMPHOCYTES # BLD AUTO: 1.15 K/UL — SIGNIFICANT CHANGE UP (ref 1–3.3)
LYMPHOCYTES # BLD AUTO: 3 % — LOW (ref 13–44)
MACROCYTES BLD QL: SLIGHT — SIGNIFICANT CHANGE UP
MANUAL SMEAR VERIFICATION: SIGNIFICANT CHANGE UP
MCHC RBC-ENTMCNC: 30.1 GM/DL — LOW (ref 32–36)
MCHC RBC-ENTMCNC: 31.7 GM/DL — LOW (ref 32–36)
MCHC RBC-ENTMCNC: 35.4 PG — HIGH (ref 27–34)
MCHC RBC-ENTMCNC: 37.6 PG — HIGH (ref 27–34)
MCV RBC AUTO: 111.8 FL — HIGH (ref 80–100)
MCV RBC AUTO: 124.8 FL — HIGH (ref 80–100)
MICROCYTES BLD QL: SIGNIFICANT CHANGE UP
MONOCYTES # BLD AUTO: 0.7 K/UL — SIGNIFICANT CHANGE UP (ref 0–0.9)
MONOCYTES # BLD AUTO: 1.53 K/UL — HIGH (ref 0–0.9)
MONOCYTES NFR BLD AUTO: 3 % — SIGNIFICANT CHANGE UP (ref 2–14)
MONOCYTES NFR BLD AUTO: 4 % — SIGNIFICANT CHANGE UP (ref 2–14)
NEUTROPHILS # BLD AUTO: 22.27 K/UL — HIGH (ref 1.8–7.4)
NEUTROPHILS # BLD AUTO: 34.79 K/UL — HIGH (ref 1.8–7.4)
NEUTROPHILS NFR BLD AUTO: 63 % — SIGNIFICANT CHANGE UP (ref 43–77)
NEUTROPHILS NFR BLD AUTO: 88 % — HIGH (ref 43–77)
NEUTS BAND # BLD: 8 % — SIGNIFICANT CHANGE UP (ref 0–8)
NRBC # BLD: 0 — SIGNIFICANT CHANGE UP
NRBC # BLD: SIGNIFICANT CHANGE UP /100 WBCS (ref 0–0)
NRBC # BLD: SIGNIFICANT CHANGE UP /100 WBCS (ref 0–0)
NT-PROBNP SERPL-SCNC: 4344 PG/ML — HIGH (ref 0–450)
NT-PROBNP SERPL-SCNC: 7201 PG/ML — HIGH (ref 0–450)
PCO2 BLDV: 49 MMHG — SIGNIFICANT CHANGE UP (ref 42–55)
PH BLDV: 7.3 — LOW (ref 7.32–7.43)
PLAT MORPH BLD: NORMAL — SIGNIFICANT CHANGE UP
PLATELET # BLD AUTO: 266 K/UL — SIGNIFICANT CHANGE UP (ref 150–400)
PLATELET # BLD AUTO: 337 K/UL — SIGNIFICANT CHANGE UP (ref 150–400)
PO2 BLDV: 38 MMHG — SIGNIFICANT CHANGE UP (ref 25–45)
POTASSIUM SERPL-MCNC: 3.4 MMOL/L — LOW (ref 3.5–5.3)
POTASSIUM SERPL-MCNC: 5.2 MMOL/L — SIGNIFICANT CHANGE UP (ref 3.5–5.3)
POTASSIUM SERPL-SCNC: 3.4 MMOL/L — LOW (ref 3.5–5.3)
POTASSIUM SERPL-SCNC: 5.2 MMOL/L — SIGNIFICANT CHANGE UP (ref 3.5–5.3)
PROT SERPL-MCNC: 6.7 G/DL — SIGNIFICANT CHANGE UP (ref 6–8.3)
PROT SERPL-MCNC: 6.8 G/DL — SIGNIFICANT CHANGE UP (ref 6–8.3)
PROTHROM AB SERPL-ACNC: 16.7 SEC — HIGH (ref 10.5–13.4)
RAPID RVP RESULT: SIGNIFICANT CHANGE UP
RBC # BLD: 1.41 M/UL — LOW (ref 4.2–5.8)
RBC # BLD: 2.71 M/UL — LOW (ref 4.2–5.8)
RBC # FLD: 18.1 % — HIGH (ref 10.3–14.5)
RBC # FLD: 21.4 % — HIGH (ref 10.3–14.5)
RBC BLD AUTO: ABNORMAL
SAO2 % BLDV: 55.9 % — LOW (ref 67–88)
SARS-COV-2 RNA SPEC QL NAA+PROBE: SIGNIFICANT CHANGE UP
SODIUM SERPL-SCNC: 147 MMOL/L — HIGH (ref 135–145)
SODIUM SERPL-SCNC: 150 MMOL/L — HIGH (ref 135–145)
TIBC SERPL-MCNC: 133 UG/DL — LOW (ref 220–430)
TRANSFERRIN SERPL-MCNC: 107 MG/DL — LOW (ref 200–360)
TROPONIN I, HIGH SENSITIVITY RESULT: 100.7 NG/L — HIGH
TROPONIN I, HIGH SENSITIVITY RESULT: 101.8 NG/L — HIGH
TSH SERPL-MCNC: 1.28 UIU/ML — SIGNIFICANT CHANGE UP (ref 0.36–3.74)
UIBC SERPL-MCNC: 117 UG/DL — SIGNIFICANT CHANGE UP (ref 110–370)
VIT B12 SERPL-MCNC: >2000 PG/ML — HIGH (ref 232–1245)
WBC # BLD: 23.2 K/UL — HIGH (ref 3.8–10.5)
WBC # BLD: 38.23 K/UL — HIGH (ref 3.8–10.5)
WBC # FLD AUTO: 23.2 K/UL — HIGH (ref 3.8–10.5)
WBC # FLD AUTO: 38.23 K/UL — HIGH (ref 3.8–10.5)

## 2022-01-01 PROCEDURE — 71045 X-RAY EXAM CHEST 1 VIEW: CPT | Mod: 26

## 2022-01-01 PROCEDURE — 99239 HOSP IP/OBS DSCHRG MGMT >30: CPT

## 2022-01-01 PROCEDURE — 70450 CT HEAD/BRAIN W/O DYE: CPT | Mod: 26,MA

## 2022-01-01 PROCEDURE — 99223 1ST HOSP IP/OBS HIGH 75: CPT | Mod: GC

## 2022-01-01 PROCEDURE — 12345: CPT | Mod: NC

## 2022-01-01 PROCEDURE — 99285 EMERGENCY DEPT VISIT HI MDM: CPT

## 2022-01-01 PROCEDURE — 93010 ELECTROCARDIOGRAM REPORT: CPT

## 2022-01-01 RX ORDER — SODIUM CHLORIDE 9 MG/ML
1000 INJECTION INTRAMUSCULAR; INTRAVENOUS; SUBCUTANEOUS
Refills: 0 | Status: DISCONTINUED | OUTPATIENT
Start: 2022-01-01 | End: 2022-01-01

## 2022-01-01 RX ORDER — DEXTROSE 50 % IN WATER 50 %
25 SYRINGE (ML) INTRAVENOUS ONCE
Refills: 0 | Status: COMPLETED | OUTPATIENT
Start: 2022-01-01 | End: 2022-01-01

## 2022-01-01 RX ORDER — MORPHINE SULFATE 50 MG/1
1 CAPSULE, EXTENDED RELEASE ORAL
Refills: 0 | Status: DISCONTINUED | OUTPATIENT
Start: 2022-01-01 | End: 2022-01-01

## 2022-01-01 RX ORDER — ENOXAPARIN SODIUM 100 MG/ML
30 INJECTION SUBCUTANEOUS EVERY 24 HOURS
Refills: 0 | Status: DISCONTINUED | OUTPATIENT
Start: 2022-01-01 | End: 2022-01-01

## 2022-01-01 RX ORDER — CEFEPIME 1 G/1
INJECTION, POWDER, FOR SOLUTION INTRAMUSCULAR; INTRAVENOUS
Refills: 0 | Status: DISCONTINUED | OUTPATIENT
Start: 2022-01-01 | End: 2022-01-01

## 2022-01-01 RX ORDER — HYDROMORPHONE HYDROCHLORIDE 2 MG/ML
0.5 INJECTION INTRAMUSCULAR; INTRAVENOUS; SUBCUTANEOUS
Refills: 0 | Status: DISCONTINUED | OUTPATIENT
Start: 2022-01-01 | End: 2022-01-01

## 2022-01-01 RX ORDER — PIPERACILLIN AND TAZOBACTAM 4; .5 G/20ML; G/20ML
3.38 INJECTION, POWDER, LYOPHILIZED, FOR SOLUTION INTRAVENOUS ONCE
Refills: 0 | Status: COMPLETED | OUTPATIENT
Start: 2022-01-01 | End: 2022-01-01

## 2022-01-01 RX ORDER — ONDANSETRON 8 MG/1
4 TABLET, FILM COATED ORAL EVERY 8 HOURS
Refills: 0 | Status: DISCONTINUED | OUTPATIENT
Start: 2022-01-01 | End: 2022-01-01

## 2022-01-01 RX ORDER — CEFEPIME 1 G/1
500 INJECTION, POWDER, FOR SOLUTION INTRAMUSCULAR; INTRAVENOUS ONCE
Refills: 0 | Status: COMPLETED | OUTPATIENT
Start: 2022-01-01 | End: 2022-01-01

## 2022-01-01 RX ORDER — ACETAMINOPHEN 500 MG
650 TABLET ORAL EVERY 6 HOURS
Refills: 0 | Status: DISCONTINUED | OUTPATIENT
Start: 2022-01-01 | End: 2022-01-01

## 2022-01-01 RX ORDER — LANOLIN ALCOHOL/MO/W.PET/CERES
3 CREAM (GRAM) TOPICAL AT BEDTIME
Refills: 0 | Status: DISCONTINUED | OUTPATIENT
Start: 2022-01-01 | End: 2022-01-01

## 2022-01-01 RX ORDER — SODIUM CHLORIDE 9 MG/ML
1500 INJECTION INTRAMUSCULAR; INTRAVENOUS; SUBCUTANEOUS ONCE
Refills: 0 | Status: COMPLETED | OUTPATIENT
Start: 2022-01-01 | End: 2022-01-01

## 2022-01-01 RX ORDER — DEXTROSE 10 % IN WATER 10 %
500 INTRAVENOUS SOLUTION INTRAVENOUS
Refills: 0 | Status: DISCONTINUED | OUTPATIENT
Start: 2022-01-01 | End: 2022-01-01

## 2022-01-01 RX ORDER — SODIUM CHLORIDE 9 MG/ML
1000 INJECTION INTRAMUSCULAR; INTRAVENOUS; SUBCUTANEOUS ONCE
Refills: 0 | Status: COMPLETED | OUTPATIENT
Start: 2022-01-01 | End: 2022-01-01

## 2022-01-01 RX ADMIN — SODIUM CHLORIDE 1000 MILLILITER(S): 9 INJECTION INTRAMUSCULAR; INTRAVENOUS; SUBCUTANEOUS at 00:10

## 2022-01-01 RX ADMIN — SODIUM CHLORIDE 1500 MILLILITER(S): 9 INJECTION INTRAMUSCULAR; INTRAVENOUS; SUBCUTANEOUS at 02:49

## 2022-01-01 RX ADMIN — CEFEPIME 500 MILLIGRAM(S): 1 INJECTION, POWDER, FOR SOLUTION INTRAMUSCULAR; INTRAVENOUS at 03:33

## 2022-01-01 RX ADMIN — Medication 25 GRAM(S): at 13:32

## 2022-01-01 RX ADMIN — SODIUM CHLORIDE 1000 MILLILITER(S): 9 INJECTION INTRAMUSCULAR; INTRAVENOUS; SUBCUTANEOUS at 21:28

## 2022-01-01 RX ADMIN — PIPERACILLIN AND TAZOBACTAM 3.38 GRAM(S): 4; .5 INJECTION, POWDER, LYOPHILIZED, FOR SOLUTION INTRAVENOUS at 00:10

## 2022-01-01 RX ADMIN — PIPERACILLIN AND TAZOBACTAM 200 GRAM(S): 4; .5 INJECTION, POWDER, LYOPHILIZED, FOR SOLUTION INTRAVENOUS at 23:18

## 2022-02-15 NOTE — ED ADULT NURSE NOTE - CAS TRG GEN SKIN COLOR
Your Child's Health  [age]-Year-Old Visit      Kimber Davila  February 15, 2022    Visit Vitals  BP 92/66   Pulse 122   Wt 21.1 kg (46 lb 6.5 oz)   SpO2 99%     Weight: 46.41 lbs      YOUR CHILD'S 4 YEAR-OLD VISIT    1. Nutrition  Keep nutritious foods in your home for meal and snack times. Your child's appetite may decrease at this age because their rate of growth is slowing down a little bit. Overweight and obesity are very serious problems; protect your child by offering them only small portions at a time and do not encourage them to eat when they are not hungry. Children should drink about 16 to 24 ounces of low-fat or no fat milk daily to meet their calcium needs; taking a multivitamin with iron (or a pure vitamin D supplement) regularly will ensure they meet their vitamin D needs (600 IU daily) as well. You are still in charge of what they are eating, so continue to avoid unhealthy choices like greasy fast food, bagged snacks, sodas and sweet drinks, lots of juice, candies and sweets. Teaching your child to prefer healthy choices (fresh fruits and vegetables, string cheese, whole-grain crackers, yogurt) at this age is easier than trying to change their preferences when they are older!    2. Healthy Routines  Most 4 year olds are fully potty-trained, but nighttime accidents may still occur. (Don’t punish your child for these; simply have them help clean up.) Establish regular routines for teeth brushing and for hand washing after toileting. Children should brush with a regular (fluoridated) toothpaste at least twice daily; brushing at bedtime is particularly important for dental health. (A parent should still be helping them brush their back teeth.) Kimber should be drinking plenty of water every day as well (tap water is safe and it provides necessary fluoride for dental health). Limit juice to no more than 4 ounces daily and do not let your child carry juice or diluted juice around in a cup or water  bottle all day – this prolonged exposure to sugar increases their chance of cavities. If you have questions about older neighborhoods in [place] that might have lead connecting (or service) pipes, do an internet search for \"[place] lead awareness and drinking water safety\".    3. Safety  Children are safest in a forward-facing car seat with a 5-point harness until they weigh at least 40 pounds. Many of the 5-point harness seats have higher weight limits than 40 pounds; your child is safest in these seats until they outgrow the 's recommended size limits. (There is not a specific height limit for most of these seats, but children are safe as long as the top of their ears are below the top/ back edge of the seat and their shoulders are below the highest shoulder strap slots.) When they do outgrow the 5-point harness seat limits, they should ride in a booster seat in the backseat.     Your child requires constant and careful supervision whenever they are playing outside, around water, or crossing a street. They should not play in streets or driveways or be in yards where someone is using a lawnmower (rocks or hard objects that are hit by lawnmowers become dangerous projectiles). Swimming lessons are appropriate for some 4 year-olds, but remember that swimming lessons and \"floaties\" (or “swimmies”) do not prevent drowning – careful supervision by an adult who know how to swim is a must. Remember to use sunscreen with an SPF of 15 or higher when outside and reapply after time in the water.  Your child should avoid prolonged time in the sun between 11 AM and 3 PM and wear hats, sunglasses and sun protection clothing. Careful supervision of children around pets is still important at this age.    Continue to make sure you keep all medications, cleaning solutions, matches, sharp items and any toxic substance well out of reach of children. Make sure you have the Poison Control number (1– 953.907.2233) in your  cell phone. If there any hunters or firearm owners in your home or anywhere your child is visiting, make sure all weapons are safely stored: unloaded, securely locked and ammunition stored separately. Firearms and a child's natural curiosity can be a very dangerous combination.    A parent’s safety is just as important as a child’s safety. Violence is common in many people’s lives. If you do not feel safe in your home or if a partner has ever hit, kicked, shoved or physically hurt you or your child, it is important for you to get help. Talk to your doctors or a . In [place], resources include [facility] (125-275-4562) and the [facility] (24 hour hotline is 044- 583-3823); the National Domestic Violence Hotline is 1-504-184-IOVV (3857).    4. Development and School Readiness   Developmentally, 4 year old children can brush teeth and go to the bathroom by themselves. They can dress and undress with very little help, including mastering medium sized buttons. They demonstrate very creative and imaginative play. They use four word sentences, and their speech is generally 100% understandable to strangers. They draw pictures of things that you recognize, and they can play games which have simple rules. They can skip and climb upstairs well without holding on.. They will also tell you a story from their favorite book!     As your child's language is continuing to develop, keep explanations short and simple (they ask a lot of questions at this age!). Encourage your child to talk about what they are seeing, doing and feeling. Visit libraries, siegel and zoos and continue to read together regularly. Talk to your doctor about your child's speech if their speech is not ~100% understandable, if they are not using four-word sentences or if they stutter frequently (occasional stuttering is usually normal).    Promoting your child's language skills and giving them opportunities to play  and interact with other children their age are important steps towards preparing them for school.  programs definitely have a positive impact on school performance, so consider enrolling your child in one if available.    5. Behavior  Sometimes parents maybe puzzled because their 4 year old seems to understand and communicate very well, but they still may lie sometimes or not take responsibility for things they do. Children this age are still trying to figure out what is expected of them, so they often test limits. Basic rules of discipline still apply: 1) make sure rules and expectations are clear (and reasonable for this age) and 2) always respond consistently when rules are broken. Encourage your child's good behaviors by treating them with respect and give them plenty of positive praise and feedback when they are warranted. Teach your child that experiencing strong emotions (mine, anger, sadness, frustration) is normal. Help them learn to describe and express their feelings and talk about ways that they can cope with them.    Children are naturally curious about their own bodies, and genital exploration at this age is normal. Gender identity issues may also start to emerge at this age; these children feel conflicted when told to dress or behave in a way that is not consistent with how they feel.    Teach your child that certain body parts (the parts usually covered by a bathing suit) and behaviors are private. For safety purposes, make sure your child knows that they should never keep secrets from parents, and they should always report to you if any adult shows any interest in their private parts (or if an adult discussed or shared their own private parts with a child).    6. Screen Time and Media Use  Electronic media (TV, tablets, computers, cell phones) is incredibly appealing to 4 year olds because they are naturally prone to fantasy and magical thinking. And while you may welcome the quiet when they  are engaged with these devices, make sure you do not rely on them to distract and quiet down your child. Limited use of educational media programs can be beneficial but excessive use of general media can contribute to lots of problems including attention issues, sleep problems, school problems, obesity, aggression and other worsening behaviors. Media use at bedtime is a cause of sleep problems and school problems (books and a bedtime routine are always the best!). Limit your child's screen time to no more than 1 hour a day and always preview and watch and discuss the content with them as much as possible. Don't invest in a TV for your child's room – there are no benefits to that! Setting rules about media use in your home is difficult but very important. For suggestions on developing healthy media habits, do an internet search for “healthychildren media use plan” for recommendations from the American Academy of Pediatrics.     7. Smoking  Smoking has negative effects on the whole family’s health. Keep your child’s environments smoke-free. If you smoke and are ready to consider quitting, talk to your doctor. Nicotine replacement products can be very helpful in breaking this tough addiction. 1-800-QUIT-NOW is a national help line that can help you find resources; other resources can be found at cdc.gov.     MEDICATION FOR FEVER OR PAIN:   Acetaminophen liquid (e.g., Tylenol or Tempra) may be given every four hours as needed for pain or fever.  Acetaminophen liquid is less concentrated than the infant dropper bottle type.  Be sure to check which product CONCENTRATION you are using.    OLD Concentration INFANT Tylenol/Acetaminophen  Drops (80 MG/0.8 mL)    Child’s Weight: Dose:  24 - 35 pounds:   160 mg (2 droppers)  36 - 47 pounds:   240 mg (3 droppers)    NEW Concentration INFANT Tylenol/Acetaminophen  Drops (160 MG/5 mL)    Child’s Weight: Dose:  24 - 35 pounds:   160 mg (5.0 mL (1 Teaspoon))  36 - 47 pounds:   240  mg (7.5 mL (1 1/2 Teaspoons))    CHILDREN’S Tylenol/Acetaminophen  (160 MG/5 mL)    Child’s Weight: Dose:  24 - 35 pounds:   160 mg (5.0 mL (1 Teaspoon))  36 - 47 pounds:   240 mg (7.5 mL (1 1/2 Teaspoons))    CHILDREN’S Tylenol/Acetaminophen MELTAWAYS ( 80 MG tablets)    Child’s Weight:  Dose:  24 - 35 pounds:    160 mg (2 meltaway tablets)  36 - 47 pounds:    240 mg (3 meltaway tablets)    CHILDREN'S Ibuprofen liquid (e.g., Advil or Motrin) may be given every six hours as needed for pain or fever.    Child’s Weight:  Dose:  24 - 35 pounds:    100 mg (1 Teaspoon)  36 - 47 pounds:    150 mg (1 1/2 Teaspoons)    If Kimber is outside these weight ranges, call your pediatrician's office for advice.    Most Recent Immunizations   Administered Date(s) Administered   • DTaP(INFANRIX) 09/26/2018   • Hep A, ped/adol, 2 dose 03/06/2019   • Hep B, adolescent or pediatric 2017   • Hib (PRP-OMP) 02/06/2019   • Hib (PRP-T) 2017   • Influenza, injectable, quadrivalent 04/09/2019   • Influenza, injectable, quadrivalent, preservative-free 10/21/2020   • MMR 11/27/2019   • Pneumococcal Conjugate 13 valent 04/09/2019   • Polio, INACTIVATED 09/26/2018   • Rotavirus - pentavalent 2017   • Varicella 09/05/2018       If Kimber develops any of the following reactions within 72 hours after an immunization, notify your pediatrician by calling the pediatric phone nurse:  1.  A temperature of 105 degrees or above.  2.  More than 3 hours of continuous crying.  3.  A shrill, high-pitched cry.  4.  A pale, limp spell.  5.  A seizure or fainting spell.  In this case, you should call 911 or go immediately to the emergency room.      NEXT VISIT:  5 YEARS OF AGE    Thank you for entrusting your care to [facility].    Also, check out “Children’s Health” on the [facility] Blog for updates on timely topics regarding children’s health!     Normal for race

## 2022-10-07 NOTE — ED PROVIDER NOTE - PROGRESS NOTE DETAILS
At length dw 2 of pts daughters - pt is DNR / DNI, agree with abx and wants blood xfusion. José Miguel Keller, will see pt to admit.

## 2022-10-07 NOTE — ED PROVIDER NOTE - OBJECTIVE STATEMENT
89-year-old male with history of end-stage renal cancer presents with brought in by EMS for altered mental status today.  Patient was reportedly in his normal state of health today, but this afternoon the aide reported that he was unresponsive.  Family was notified and came to see him this evening, finding him unresponsive they called EMS to bring him to the hospital.  No known fevers or chills.  No known trauma, patient is confined to a hospital bed.  No known COVID exposure.  Patient vaccinated for COVID.  No other history available.

## 2022-10-07 NOTE — ED PROVIDER NOTE - CARE PLAN
Principal Discharge DX:	Altered mental status  Secondary Diagnosis:	Pneumonia  Secondary Diagnosis:	Anemia   1

## 2022-10-07 NOTE — ED ADULT TRIAGE NOTE - CHIEF COMPLAINT QUOTE
per ems from home, unresponsive at home since 2pm, arrives on NRB. end stage renal cancer. per ems from home, unresponsive at home since 2pm, arrives on NRB. end stage renal cancer. DNR/DNI per ems

## 2022-10-08 NOTE — H&P ADULT - PROBLEM SELECTOR PLAN 3
Pt unresponsive on arrival, meets sepsis criteria  - Satting 88% on NRB with RR 22  - CXR: Congestive edema vs atypical infection as per personal read, official read pending   - VBG: pH: 7.30, PO2: 38, PCO2: 49, HCO3: 24, SpO2: 55.9%  - Supplement O2 PRN, titrate as needed. Currently on NRB.  - Antibiotics management as per above  - Continue to monitor respiratory status  - DNI

## 2022-10-08 NOTE — ED ADULT NURSE NOTE - OBJECTIVE STATEMENT
pt BIBA pt cachetic end stage renal CA.  daughter at bedside.  as per daughter, pt has been unresponsive since 1400.  daughter noted cough started yesterday

## 2022-10-08 NOTE — H&P ADULT - HISTORY OF PRESENT ILLNESS
89 yo M with PMH end stage renal cancer presents to the ED with unresponsiveness. Pt was normal state of health today but was found to be unresponsive this afternoon by HHA. Family was called over to the home who called EMS.       ED Course:   Vitals: BP: 77/55, HR: 114, Temp: , RR: 22, SpO2: 88% on NRB   Labs: wbc: 23.20, H/H: 5.3/17.6, MCV: 124.8, neut: 22.27, Na: 147, BUN/Cr: 58/3.10, GFR: 18, alb: 2.8, t. bili: 1.8, AST: 120/ ALT: 103, lactate: 6.2, trop: 101.8, proBNP: 4,344  VBG: pH: 7.30, PO2: 38, PCO2: 49, HCO3: 24, SpO2: 55.9%  UA: CXR: as per personal read, official read pending   CT Head: No acute intracranial bleed, mass effect or acute territorial infarct.  EKG:   Received Zosyn x1, 1L NS bolus x1 in the ED    89 yo M with PMH end stage renal cancer presents to the ED with unresponsiveness. Pt was in normal state of health today but was found to be unresponsive this afternoon by HHA. Family was called over to the home who called EMS. No known fevers or chills. No known trauma. No known COVID exposure.    Information received from charts, attempted >15 times to contact patient's son and daughter but unable to reach.     ED Course:   Vitals: BP: 77/55, HR: 114, Temp: , RR: 22, SpO2: 88% on NRB   Labs:   WBC: 23.20, H/H: 5.3/17.6, MCV: 124.8, neut: 22.27, lactate 6.2  Na: 147, BUN/Cr: 58/3.10, GFR: 18  Alb: 2.8, T. bili: 1.8, AST: 120/ ALT: 103  Trop: 101.8, proBNP: 4,344  VBG: pH: 7.30, PO2: 38, PCO2: 49, HCO3: 24, SpO2: 55.9%  Imaging:   CXR: Congestive edema vs atypical infection as per personal read, official read pending   CT Head: No acute intracranial bleed, mass effect or acute territorial infarct.  EKG: Sinus tachy 111bpm  Received Zosyn x1, 1L NS bolus x1 in the ED  89 yo M with PMH end stage renal cancer presents to the ED with unresponsiveness. Pt was in normal state of health today but was found to be unresponsive this afternoon by HHA. Family was called over to the home who called EMS. No known fevers or chills. No known trauma. No known COVID exposure.    Information received from charts, attempted >15 times to contact patient's son and daughter but unable to reach.   Per ED RN daughter stated patient was going to go on hospice however daughter stated no one ever contacted her to set it up. Upon arrival in the ed daughter and son stated patient dnr/dni however they would like blood transfusion, antibiotics and fluids. Copy of MOLST provided which states no dialysis, no transfusions however daughter told ed staff she changed her mind and wanted blood transfusion.     ED Course:   Vitals: BP: 77/55, HR: 114, Temp: , RR: 22, SpO2: 88% on NRB   Labs:   WBC: 23.20, H/H: 5.3/17.6, MCV: 124.8, neut: 22.27, lactate 6.2  Na: 147, BUN/Cr: 58/3.10, GFR: 18  Alb: 2.8, T. bili: 1.8, AST: 120/ ALT: 103  Trop: 101.8, proBNP: 4,344  VBG: pH: 7.30, PO2: 38, PCO2: 49, HCO3: 24, SpO2: 55.9%  Imaging:   CXR: Congestive edema vs atypical infection as per personal read, official read pending   CT Head: No acute intracranial bleed, mass effect or acute territorial infarct.  EKG: Sinus tachy 111bpm  Received Zosyn x1, 1L NS bolus x1 in the ED

## 2022-10-08 NOTE — PROGRESS NOTE ADULT - PROBLEM SELECTOR PLAN 8
BNP 4344, Trop 101.8  - likely in the setting of ckd   - EKG: Sinus tachy 111bpm  - CXR shows congestive edema vs atypical infection as per personal read, pending official read  - Given 1L NS bolus x1 in the ED  - Hypotension that improved with bolus  - Will continue to monitor status  - Trend BNP

## 2022-10-08 NOTE — PROGRESS NOTE ADULT - PROBLEM SELECTOR PLAN 2
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 However as per ED provider and ED registered nurse, they stated that they had a long discussion with son and daughter, who wanted a transfusion to be done and who stated that they were "unclear why they filled out MOLST form in that way"  - F/u iron panel: iron, ferritin, TIBC, transferrin  - F/u folate, vitamin B12, TSH  - F/u CBC after transfusion  - Continue to trend CBC

## 2022-10-08 NOTE — PROGRESS NOTE ADULT - PROBLEM SELECTOR PLAN 7
Trop 101.8 on admission  - Likely 2/2 to demand, trend to peak   - EKG: Sinus tachy 111bpm  - Monitor for signs and symptoms

## 2022-10-08 NOTE — H&P ADULT - PROBLEM SELECTOR PLAN 9
Pt has MOLST form filled out stating DNR/DNI with no dialysis or transfusions. Trial of IVF and antibiotics with minimal interventions.  - As per ED provider and ED registered nurse who had a long discussion with son and daughter, family would want the patient to receive a transfusion tonight. Family stated they were "unsure why they filled out the molst form that way".  - Attempted to reach son and daughter's contact number >15 times but was unable to reach them.  - Palliative consulted for GOC discussion and further clarification. Pt is hospice candidate. Pt has MOLST form filled out stating DNR/DNI with no dialysis or transfusions. Trial of IVF and antibiotics with minimal interventions.  - As per ED provider and ED registered nurse who had a long discussion with son and daughter, family would want the patient to receive a transfusion tonight. Family stated they were "unsure why they filled out the molst form that way".  - Attempted to reach son and daughter's contact number >15 times but was unable to reach them.  - Palliative consulted for GOC discussion and further clarification. Pt is hospice candidate.  - will continue limited medical interventions as stated in copy of molst provided by children, will give transfusion and antibiotics per family request. New MOLST to be completed in the am by primary team or palliative care when children are able to be reached.   - patient is hospice appropriate. Primary team to further clarify GOC

## 2022-10-08 NOTE — ED ADULT NURSE REASSESSMENT NOTE - NS ED NURSE REASSESS COMMENT FT1
MOLST brought in by daughter and son.  as per MOLST, pt should have no dialysis and no transfusions.  Dr Wetzel spoke with daughter and son and were agreeable to receiving a unit of PRBC's   notified son and daughter that a new MOLST should be signed prior to leaving hospital clearly stating their wishes.  DNR/DNI remains, but 1 unit PRBC to be transfused.
S/P 1 unit PRBC, tolerated unit well without any S/S adverse reaction. Dr Latif aware of pt temp, is just requesting a STAT repeat CBC now.
Critical labs discussed with Dr Salazar.  Glucose 5, 1 amp D 50% administered.  When I went into the room to repeat FS, daughter at bedside refusing accucheck stating she wants her father to "be in peace" and doesn't want any further testing performed.  Dr Latif also at bedside and is aware of daughter's wishes.  Will make pt comfort care measures only.
Admitted for anemia and pneumonia. Blood transfusion in progress. Pt came into ER unresponsive and is currently only responsive to painful stim.  VSS.  DNR.

## 2022-10-08 NOTE — H&P ADULT - PROBLEM SELECTOR PLAN 6
T bili 1.8, ,   - Baseline is WNL  - Unable to assess whether there is clinical sign of acute processes  - Pt has hx of cholecystomy and appendectomy  - Consider CTAP once condition improves enough for pt to tolerate it  - Continue to trend LFTs

## 2022-10-08 NOTE — PROGRESS NOTE ADULT - PROBLEM SELECTOR PLAN 1
Patient meets sepsis criteria on admission: leukocytosis, tachycardia, tachypnea with signs of infection  - Presented with hypotension down to the 80s, received bolus and still in the 80s  - CXR: Congestive edema vs atypical infection as per personal read, official read pending   - CT Head: No acute intracranial bleed, mass effect or acute territorial infarct.  - Consider CT chest and CT AP once patient is able to tolerate it  - Given Zosyn x1, IV NS 1 L bolus x1 in ED  - Will start on renally dosed 500mg cefepime q24h for broad spectrum coverage  - Lactate 6.2 on admission, fu repeat   - Trend WBC and vitals, monitor for fever  - F/u UA, UCx, BCx x2  - F/u repeat lactate and trend  - Pt w/ DNR/DNI MOLST form filled with minimal intervention - unclear what family wants

## 2022-10-08 NOTE — CONSULT NOTE ADULT - ASSESSMENT
89-year-old male with history of end-stage renal cancer presents with brought in by EMS for altered mental status    ams  hypoxemia  renal ca  mets ca  velasquez  ckd    on o2 support - wean as tolerated - keep sat > 88 pct    will change Morphine to Dilaudid in pt with renal failure    pt is DNR DNI    VS noted - labs reviewed - imaging noted -     prognosis poor - pt is hospice appropriate    H and P - ER provider note - reviewed

## 2022-10-08 NOTE — PROGRESS NOTE ADULT - PROBLEM SELECTOR PLAN 10
DVT PPx with lovenox 30mg qd renally dosed    #Medical Management  - Unable to reach family after many attempts to contact them, unclear what directives they desire  - Unable to retrieve medication list with pharmacy closed  - Unable to obtain medical problems patient being unresponsive and unable to reach family members  - primary team to contact family and pharmacy in am

## 2022-10-08 NOTE — H&P ADULT - PROBLEM SELECTOR PLAN 8
BNP 4344, Trop 101.8  - Pt does not appear to be volume overloaded on clinical exam  - EKG: Sinus tachy 111bpm  - CXR shows congestive edema vs atypical infection as per personal read, pending official read  - Given 1L NS bolus x1 in the ED  - Hypotension that improved with bolus  - Will continue to monitor status  - Trend BNP BNP 4344, Trop 101.8  - likely in the setting of ckd   - EKG: Sinus tachy 111bpm  - CXR shows congestive edema vs atypical infection as per personal read, pending official read  - Given 1L NS bolus x1 in the ED  - Hypotension that improved with bolus  - Will continue to monitor status  - Trend BNP

## 2022-10-08 NOTE — PROGRESS NOTE ADULT - PROBLEM SELECTOR PLAN 5
Pt found at home unresponsive after being reported to be in a normal state of healthy today  - Received Zosyn x1, 1L NS bolus x1 in the ED  - EKG: Sinus tachy 111bpm  - CT Head: No acute intracranial bleed, mass effect or acute territorial infarct.  - Unclear source, possibly from sepsis  - Continue to monitor condition and mental status

## 2022-10-08 NOTE — H&P ADULT - PROBLEM SELECTOR PLAN 5
Pt found at home unresponsive after being reported to be in a normal state of healthy today  - Continues to be unresponsive in the ED, unable to be woken up  - Received Zosyn x1, 1L NS bolus x1 in the ED  - EKG: Sinus tachy 111bpm  - CT Head: No acute intracranial bleed, mass effect or acute territorial infarct.  - Unclear source, possibly from sepsis  - Continue to monitor condition and mental status Pt found at home unresponsive after being reported to be in a normal state of healthy today  - Received Zosyn x1, 1L NS bolus x1 in the ED  - EKG: Sinus tachy 111bpm  - CT Head: No acute intracranial bleed, mass effect or acute territorial infarct.  - Unclear source, possibly from sepsis  - Continue to monitor condition and mental status

## 2022-10-08 NOTE — H&P ADULT - PROBLEM SELECTOR PLAN 2
father/mother H/H 5.3/17.6 on admission  - Hg 8.1-8.3 on prior visit in 2021  - Anemia in setting of active infection and sepsis  - Currently hypotensive  - Unclear if there are any active bleed  - Will transfuse 1x unit pRBC now  - Blood transfusion consent signed and placed in chart  - Maintain active type and screen  - Pt has MOLST form filled stating no transfusion. However as per ED provider and ED registered nurse, they stated that they had a long discussion with son and daughter, who wanted a transfusion to be done and who stated that they were "unclear why they filled out MOLST form in that way"  - F/u iron panel: iron, ferritin, TIBC, transferrin  - F/u folate, vitamin B12, TSH  - F/u CBC after transfusion  - Continue to trend CBC H/H 5.3/17.6 on admission  - Hg 8.1-8.3 on prior visit in 2021  - Anemia in setting of active infection and sepsis with likely component of chornic disease 2/2 ckd and end stage renal cancer   - Currently hypotensive  - Will transfuse 1x unit pRBC now  - Blood transfusion consent signed and placed in chart  - Maintain active type and screen  - Pt has MOLST form filled stating no transfusion. However as per ED provider and ED registered nurse, they stated that they had a long discussion with son and daughter, who wanted a transfusion to be done and who stated that they were "unclear why they filled out MOLST form in that way"  - F/u iron panel: iron, ferritin, TIBC, transferrin  - F/u folate, vitamin B12, TSH  - F/u CBC after transfusion  - Continue to trend CBC

## 2022-10-08 NOTE — H&P ADULT - PROBLEM SELECTOR PLAN 4
HERB on CKD unclear source with no presentable history likely 2/2 acute infectious process  - BUN/Cr 58/3.1 on admission  - Baseline Cr: 1.4-1.5  - GFR was in the 40s on prior ED visits  - Pt w/ hx of end stage renal cancer and CKD  - Given 1L NS bolus x1 in the ED  - Monitor BMP  - Monitor renal indices and urine output  - Avoid nephrotoxic medications HERB on CKD in the setting of end stage renal cancer.   - BUN/Cr 58/3.1 on admission  - Baseline Cr: 1.4-1.5  - GFR was in the 40s on prior ED visits  - Pt w/ hx of end stage renal cancer and CKD  - Given 1L NS bolus x1 in the ED  - Monitor BMP  - Monitor renal indices and urine output  - Avoid nephrotoxic medications

## 2022-10-08 NOTE — CONSULT NOTE ADULT - SUBJECTIVE AND OBJECTIVE BOX
Date/Time Patient Seen:  		  Referring MD:   Data Reviewed	       Patient is a 89y old  Male who presents with a chief complaint of Unresponsiveness (08 Oct 2022 01:43)      Subjective/HPI     PAST MEDICAL & SURGICAL HISTORY:  No pertinent past medical history    Low blood pressure    Renal cancer    History of cholecystectomy    History of appendectomy    History of tonsillectomy          Medication list         MEDICATIONS  (STANDING):  cefepime   IVPB      sodium chloride 0.9%. 1000 milliLiter(s) (75 mL/Hr) IV Continuous <Continuous>    MEDICATIONS  (PRN):  acetaminophen     Tablet .. 650 milliGRAM(s) Oral every 6 hours PRN Temp greater or equal to 38C (100.4F), Mild Pain (1 - 3)  melatonin 3 milliGRAM(s) Oral at bedtime PRN Insomnia  ondansetron Injectable 4 milliGRAM(s) IV Push every 8 hours PRN Nausea and/or Vomiting         Vitals log        ICU Vital Signs Last 24 Hrs  T(C): 36.5 (08 Oct 2022 06:07), Max: 36.5 (08 Oct 2022 06:07)  T(F): 97.7 (08 Oct 2022 06:07), Max: 97.7 (08 Oct 2022 06:07)  HR: 98 (08 Oct 2022 07:20) (86 - 114)  BP: 136/64 (08 Oct 2022 07:20) (77/55 - 136/64)  BP(mean): --  ABP: --  ABP(mean): --  RR: 21 (08 Oct 2022 07:20) (21 - 24)  SpO2: 92% (08 Oct 2022 07:20) (88% - 92%)    O2 Parameters below as of 08 Oct 2022 07:20  Patient On (Oxygen Delivery Method): mask, Venturi                 Input and Output:  I&O's Detail      Lab Data                        5.3    23.20 )-----------( 266      ( 07 Oct 2022 21:00 )             17.6     10-07    147<H>  |  111<H>  |  58<H>  ----------------------------<  99  5.2   |  20<L>  |  3.10<H>    Ca    9.0      07 Oct 2022 21:00    TPro  6.8  /  Alb  2.8<L>  /  TBili  1.8<H>  /  DBili  x   /  AST  120<H>  /  ALT  103<H>  /  AlkPhos  63  10-07      CARDIAC MARKERS ( 07 Oct 2022 21:00 )  x     / x     / 43 U/L / x     / x            Review of Systems	      Objective     Physical Examination        Pertinent Lab findings & Imaging      Mariee:  NO   Adequate UO     I&O's Detail           Discussed with:     Cultures:	        Radiology      ACC: [id number] EXAM:  CT BRAIN                          PROCEDURE DATE:  10/07/2022          INTERPRETATION:  CLINICAL INFORMATION:  ams today    TECHNIQUE:  Axial CT images were acquired through the head.  Intravenous contrast: None  Two-dimensional reformats were generated.    COMPARISON STUDY: CT brain 10/19/2021.    FINDINGS: The ventricles and cortical sulci prominent, consistent with   mild/moderate diffuse cerebral atrophy. Cerebellar volume loss is seen as   well. There is mild chronic ventricular white matter small vessel   ischemic disease. No acute intracranial bleed, extra-axial fluid   collection, mass effect, midline shift, hydrocephalus, acute territorial   infarct or depressed skull fracture evident. There is calcific   atherosclerosis of bilateral cavernous ICAs and bilateral distal   vertebral arteries. The imaged orbits demonstrate bilateral scleral   calcifications. The imaged paranasal sinuses demonstrate mild right   sphenoid sinus mucosal thickening. Imaged mastoid air cells are clear.    IMPRESSION: No acute intracranial bleed, mass effect or acute territorial   infarct.    --- End of Report ---            ANNE-MARIE PARR MD; Attending Radiologist  This document has been electronically signed. Oct  8 2022 12:26AM                         Date/Time Patient Seen:  		  Referring MD:   Data Reviewed	       Patient is a 89y old  Male who presents with a chief complaint of Unresponsiveness (08 Oct 2022 01:43)      Subjective/HPI  vs noted  labs reviewed  imaging reviewed  H and P reviewed  er provider note reviewed   History of Present Illness:  Reason for Admission: Unresponsiveness  History of Present Illness:   89 yo M with PMH end stage renal cancer presents to the ED with unresponsiveness. Pt was in normal state of health today but was found to be unresponsive this afternoon by HHA. Family was called over to the home who called EMS. No known fevers or chills. No known trauma. No known COVID exposure.  Information received from charts, attempted >15 times to contact patient's son and daughter but unable to reach.   Per ED RN daughter stated patient was going to go on hospice however daughter stated no one ever contacted her to set it up. Upon arrival in the ed daughter and son stated patient dnr/dni however they would like blood transfusion, antibiotics and fluids. Copy of MOLST provided which states no dialysis, no transfusions however daughter told ed staff she changed her mind and wanted blood transfusion.        PAST MEDICAL & SURGICAL HISTORY:  No pertinent past medical history    Low blood pressure    Renal cancer    History of cholecystectomy    History of appendectomy    History of tonsillectomy          Medication list         MEDICATIONS  (STANDING):  cefepime   IVPB      sodium chloride 0.9%. 1000 milliLiter(s) (75 mL/Hr) IV Continuous <Continuous>    MEDICATIONS  (PRN):  acetaminophen     Tablet .. 650 milliGRAM(s) Oral every 6 hours PRN Temp greater or equal to 38C (100.4F), Mild Pain (1 - 3)  melatonin 3 milliGRAM(s) Oral at bedtime PRN Insomnia  ondansetron Injectable 4 milliGRAM(s) IV Push every 8 hours PRN Nausea and/or Vomiting         Vitals log        ICU Vital Signs Last 24 Hrs  T(C): 36.5 (08 Oct 2022 06:07), Max: 36.5 (08 Oct 2022 06:07)  T(F): 97.7 (08 Oct 2022 06:07), Max: 97.7 (08 Oct 2022 06:07)  HR: 98 (08 Oct 2022 07:20) (86 - 114)  BP: 136/64 (08 Oct 2022 07:20) (77/55 - 136/64)  BP(mean): --  ABP: --  ABP(mean): --  RR: 21 (08 Oct 2022 07:20) (21 - 24)  SpO2: 92% (08 Oct 2022 07:20) (88% - 92%)    O2 Parameters below as of 08 Oct 2022 07:20  Patient On (Oxygen Delivery Method): mask, Venturi                 Input and Output:  I&O's Detail      Lab Data                        5.3    23.20 )-----------( 266      ( 07 Oct 2022 21:00 )             17.6     10-07    147<H>  |  111<H>  |  58<H>  ----------------------------<  99  5.2   |  20<L>  |  3.10<H>    Ca    9.0      07 Oct 2022 21:00    TPro  6.8  /  Alb  2.8<L>  /  TBili  1.8<H>  /  DBili  x   /  AST  120<H>  /  ALT  103<H>  /  AlkPhos  63  10-07      CARDIAC MARKERS ( 07 Oct 2022 21:00 )  x     / x     / 43 U/L / x     / x            Review of Systems	    ams  hypoxemia    Objective     Physical Examination    heart s1s2  lung dc BS  head nc  on o2 support      Pertinent Lab findings & Imaging      Mariee:  NO   Adequate UO     I&O's Detail           Discussed with:     Cultures:	        Radiology      ACC: [id number] EXAM:  CT BRAIN                          PROCEDURE DATE:  10/07/2022          INTERPRETATION:  CLINICAL INFORMATION:  ams today    TECHNIQUE:  Axial CT images were acquired through the head.  Intravenous contrast: None  Two-dimensional reformats were generated.    COMPARISON STUDY: CT brain 10/19/2021.    FINDINGS: The ventricles and cortical sulci prominent, consistent with   mild/moderate diffuse cerebral atrophy. Cerebellar volume loss is seen as   well. There is mild chronic ventricular white matter small vessel   ischemic disease. No acute intracranial bleed, extra-axial fluid   collection, mass effect, midline shift, hydrocephalus, acute territorial   infarct or depressed skull fracture evident. There is calcific   atherosclerosis of bilateral cavernous ICAs and bilateral distal   vertebral arteries. The imaged orbits demonstrate bilateral scleral   calcifications. The imaged paranasal sinuses demonstrate mild right   sphenoid sinus mucosal thickening. Imaged mastoid air cells are clear.    IMPRESSION: No acute intracranial bleed, mass effect or acute territorial   infarct.    --- End of Report ---            ANNE-MARIE MOHUCHY MD; Attending Radiologist  This document has been electronically signed. Oct  8 2022 12:26AM          ACC: [id number] EXAM:  XR CHEST PORTABLE URGENT 1V                          PROCEDURE DATE:  10/07/2022          INTERPRETATION:  Exam:XR CHEST URGENT    clinical history:Altered mental status    Heart size is normal. Lungs show no acute infiltrate. No pleural effusion.    IMPRESSION:  No active parenchymal disease in the chest.        --- End of Report ---            NICCI PENNINGTON MD; Attending Radiologist  This document has been electronically signed. Oct  8 2022 10:54AM

## 2022-10-08 NOTE — H&P ADULT - PROBLEM SELECTOR PLAN 7
Trop 101.8 on admission  - Likely 2/2 to acute infection and stress  - EKG: Sinus tachy 111bpm  - Monitor for signs and symptoms Trop 101.8 on admission  - Likely 2/2 to demand, trend to peak   - EKG: Sinus tachy 111bpm  - Monitor for signs and symptoms

## 2022-10-08 NOTE — PATIENT PROFILE ADULT - FALL HARM RISK - RISK INTERVENTIONS
Visual Cue: Yellow wristband/Bed in lowest position, wheels locked, appropriate side rails in place/Physically safe environment - no spills, clutter or unnecessary equipment

## 2022-10-08 NOTE — ED ADULT NURSE NOTE - NSSUHOSCREENINGYN_ED_ALL_ED
NAIN SERNA  : 1942  ACCOUNT:  [id number]  [phone]  PCP: Dr. Sekou Conn     TODAY'S DATE: 2018  DICTATED BY:  [Dr. Dean Hager]      CHIEF COMPLAINT: [Followup of hosp. d/c.]    HPI:    [On 2018, Nain Serna, a 76 year old female, presented with dyspnea and edema.]    This is a 76-year-old female patient with a history of permanent A. fib as well as VT status post Medtronic single-chamber ICD back in  by Dr. Mcdonald.  Cath in  showed normal coronaries.  Most recent echocardiogram on November 3 shows an EF of 50-55% with mild to moderate MR and a moderately dilated left atrium.  Of note EF has been as low as 25-30% a few years back.  Recently she has been having issues with more frequent VT episodes.  We did increase her amiodarone to 200 mg twice daily back on  for frequent VT requiring ATP and one ICD shock.    She was recently admitted to the hospital back in February for intermittent dizziness, headache, and fatigue that seems to be unrelated to her cardiac issues.  Her ICD was interrogated at that time it did show further episodes of VT on 2/3 and  and  all of which terminated with ATP.  Patient does admit that she can feel every time she has VT.    I saw her in the hospital and interestingly enough she gained in A. fib and was actually pretty bradycardic with a heart rate hovering around 40-42 bpm.  Historically she is actually had fast heart rates and was on combination of beta-blocker, calcium channel blocker, and digoxin.  Digoxin level was also 1.74.    I discontinued her digoxin and decrease her Cardizem down to 120 mg daily to avoid needing to increase her pacing rate which would make her RV paced more frequently.  I also kept her on the same dose of amiodarone and she was subsequently discharged home.  She was seen by neurology and did have a head CT of which was normal.    Since being home she continues to have these intermittent dizziness and headache  spells.  She also mentions that checking her blood pressure at home her blood pressure can be markedly elevated with a systolic blood pressure up to 190 mmHg.  She is also complaining of some mild discomfort at the ICD site.    I interrogated her ICD today.  There have been no more VT episodes since it was last interrogated in early February.  In addition her Optivol level is in the normal range.    NYHA Class: 2  RISK FACTORS:  CAD - Hypertension Weight Family    REVIEW OF SYSTEMS:    CONS: no fever, chills, or recent weight changes. EYES: denies significant visual changes. ENMT: denies difficulties with hearing, otherwise negative. CV: peripheral edema, see CV exam. RESP: dyspnea. GI: denies melena, hematochezia. : no hematuria. INTEG: no new rashes, lesions. MS: difficulty in walking. NEURO: lightheaded,dizziness. HEM/LYMPH: denies easy bruising. ALL: no new food or enviornmental allergies.      PAST HISTORY: arthritis, sleep apnea, asthma, diabetes, bilateral knee replacement 1990, hysterectomy 1986, appendectomy 20 years ago and hernia x6    PAST CV HISTORY: 10/2014, atrial fibrillation, cardiac catheterization, dyslipidemia, hypertension, ICD (Medtronic)  MRI compatible 7/2016, [facility] October 2007 and ventricular tachycardia    FAMILY HISTORY: Significant for premature CAD. Negative for AAA.  SOCIAL HISTORY: SMOKING: Never used tobacco. denies smoking. CAFFEINE: caffeine free. ALCOHOL: denies drinking. EXERCISE: attempting to be active. DIET: no special diet. MARITAL STATUS: .     ALLERGIES: Lisinopril - Oral, Cough    MEDICATIONS: Selected prescriptions see below    VITAL SIGNS: [B/P - 144/70 , Pulse - 61, Weight -  199, Height -   66 , BMI - 32.1 ]    CONS: well developed, well nourished. WEIGHT: BMI parameters reviewed and discussed. HEAD/FACE: no trauma and normocephalic. EYES: conjunctivae not injected and no xanthelasma. ENT: mucosa pink and moist. NECK: jugular venous pressure not elevated. RESP:  respirations with normal rate and rhythm, clear to auscultation. CHEST/BREASTS: left ICD pocket is well healed.  No signs of infection. GI: deferred and obese. MS: inadequate gait for exercise/testing. EXT: edema bilaterally.  SKIN: warm to touch.  NEURO/PSYCH: alert and oriented to time, place and person and normal affect.      CV: PALP: PMI not displaced, no lifts and thrills or rub. AUSC:  irregular rhythm; no pathologic murmurs. EXT: 2+ edema bilaterally to knees.     DECISION MAKIN-year-old female with a history of permanent atrial fibrillation on Coumadin as well as VT status post Medtronic single-chamber ICD implantation in 2016 who presents for post hospital follow-up.  She has been having more frequent VT requiring ATP as well as ICD shock and her amiodarone was recently increased to 200 mg p.o. twice daily.  Blood pressure also seems to be high.    ASSESSMENT:  1. Heart failure, systolic, acute  2. Coumadin Management,[facility]  3. Hypercholesteremia, pure  4. Hypertension, benign  5. ICD reprogramming  6. ICD, not pacemaker dependent  7. Unspecified atrial fibrillation  8. Unspecified atrial fibrillation  9. Ventricular tachycardia      PLAN:  [1.  We will increase her losartan to 50 mg daily to see if we can get a better handle on her blood pressure.  It is possible that her hypertensive episodes could be causing some of this dizziness and headache.  We did decrease her Cardizem 220 mg daily which could be part of the reason why her blood pressure is higher.  2.  We talked about various issues today in the presence of her son.  She has had no more VT episodes so we will continue the amiodarone at the current dose of 200 mg p.o. twice daily.  Will check LFTs and TSH in 4 months time.  We discussed about possibly adding mexiletine her to her medical regimen but patient is already on a number of medications and would prefer not to add any further medications.  If she continues to have VT episodes I would  Yes - the patient is able to be screened probably recommend a VT ablation at that point.  3.  Her heart rate today is actually better than it was in the hospital and looking at the cardiac Compass on her ICD interrogation her heart rates now seem to be around 60-70 bpm.  We will continue to the digoxin and keep her on the lower dose of the Cardizem.  Her last EF is actually normal so we could always increase the pacing rate if her heart rate continues to be low.  That being said we would have to keep a close eye on her EF and if there is any further deterioration in her EF she may qualify for a BiV ICD upgrade at that point.  4.  f/u wtih APN in 2-3 weeks for further BP med adjustment.  I will see her back in 4 months time.]    PRESCRIPTIONS:   05/25/17 *Coumadin             5MG       1 by mouth as directed by [facility] Coumad     03/21/16 *Losartan Potassium   25MG      1 TABLET DAILY.                          03/01/18 Caltrate 600+D3 Soft  600-800M  1 TABLET DAILY                           03/01/18 DilTIAZem HCl ER      120MG     1 TABLET DAILY                           03/01/18 Metoprolol Tartrate   50MG      1 TABLET TWICE DAILY                     03/01/18 One Daily For Women             1 TABLET DAILY                           03/01/18 Torsemide             20MG      1 TABLET TWICE DAILY                     03/01/18 Vitamin D-3           1000UNIT  OTC 2000MG WEDNESDAY ONLY                01/29/18 Amiodarone HCl        200MG     twice daily                              12/11/17 Magnesium Oxide       400MG     1 tablet by mouth daily                  08/05/16 Klor-Con M20          20MEQ     1 TABLET DAILY                           08/11/10 Lipitor               10MG      1 TABLET DAILY AT BEDTIME.               08/11/10 MetFORMIN HCl         500MG     one tablet twice daily                   08/12/09 Spiriva HandiHaler (In          18mcg as directed

## 2022-10-08 NOTE — H&P ADULT - ATTENDING COMMENTS
89 yo M with PMH end stage renal cancer presents to the ED with unresponsiveness being admitted for sepsis and unresponsiveness.      Agree with above. Edited where appropriate directly into the body of the note    Family provided ed staff with copy of MOLST form filled out stating DNR/DNI with no dialysis or transfusions. Trial of IVF and antibiotics with minimal interventions.  - As per ED provider Rashard and ED registered nurse Roseann  who had a long discussion with son and daughter, family would want the patient to receive a transfusion tonight. Family stated they were "unsure why they filled out the molst form that way".  - Attempted to reach son and daughter's contact number >15 times but was unable to reach them.  - Palliative consulted for GOC discussion and further clarification. Pt is hospice candidate.  - will continue limited medical interventions as stated in copy of molst provided by children, will give transfusion and antibiotics per family request. New MOLST to be completed in the am by primary team or palliative care when children are able to be reached.   - Primary team to further clarify GOC. Patient would benefit from comfort measures and hospice at this stage of his illness.

## 2022-10-08 NOTE — H&P ADULT - PROBLEM SELECTOR PLAN 10
DVT PPx with lovenox 40mg qd    #Medical Management  - Unable to reach family after many attempts to contact them, unclear what directives they desire  - Unable to retrieve medication list with pharmacy closed  - Unable to obtain medical problems patient being unresponsive and unable to reach family members DVT PPx with lovenox 30mg qd renally dosed    #Medical Management  - Unable to reach family after many attempts to contact them, unclear what directives they desire  - Unable to retrieve medication list with pharmacy closed  - Unable to obtain medical problems patient being unresponsive and unable to reach family members DVT PPx with lovenox 30mg qd renally dosed    #Medical Management  - Unable to reach family after many attempts to contact them, unclear what directives they desire  - Unable to retrieve medication list with pharmacy closed  - Unable to obtain medical problems patient being unresponsive and unable to reach family members  - primary team to contact family and pharmacy in am

## 2022-10-08 NOTE — PROGRESS NOTE ADULT - PROBLEM SELECTOR PLAN 4
HERB on CKD in the setting of end stage renal cancer.   - BUN/Cr 58/3.1 on admission  - Baseline Cr: 1.4-1.5  - GFR was in the 40s on prior ED visits  - Pt w/ hx of end stage renal cancer and CKD  - Given 1L NS bolus x1 in the ED  - Monitor BMP  - Monitor renal indices and urine output  - Avoid nephrotoxic medications

## 2022-10-08 NOTE — ED ADULT NURSE NOTE - CHIEF COMPLAINT QUOTE
per ems from home, unresponsive at home since 2pm, arrives on NRB. end stage renal cancer. DNR/DNI per ems

## 2022-10-08 NOTE — H&P ADULT - PROBLEM SELECTOR PLAN 1
Patient meets sepsis criteria on admission: leukocytosis, tachycardia, tachypnea with signs of infection  - Presented with hypotension down to the 80s, received bolus and still in the 80s  - CXR: Congestive edema vs atypical infection as per personal read, official read pending   - CT Head: No acute intracranial bleed, mass effect or acute territorial infarct.  - Consider CT chest and CT AP once patient is able to tolerate it, currently unstable  - Given Zosyn x1, IV NS 1 L bolus x1 in ED  - Will start on renally dosed 500mg cefepime q24h for broad spectrum coverage  - Lactate 6.2 on admission  - Trend WBC and vitals, monitor for fever  - F/u UA, UCx, BCx x2  - F/u repeat lactate and trend  - Pt w/ DNR/DNI MOLST form filled with minimal intervention - unclear what family wants Patient meets sepsis criteria on admission: leukocytosis, tachycardia, tachypnea with signs of infection  - Presented with hypotension down to the 80s, received bolus and still in the 80s  - CXR: Congestive edema vs atypical infection as per personal read, official read pending   - CT Head: No acute intracranial bleed, mass effect or acute territorial infarct.  - Consider CT chest and CT AP once patient is able to tolerate it  - Given Zosyn x1, IV NS 1 L bolus x1 in ED  - Will start on renally dosed 500mg cefepime q24h for broad spectrum coverage  - Lactate 6.2 on admission, fu repeat   - Trend WBC and vitals, monitor for fever  - F/u UA, UCx, BCx x2  - F/u repeat lactate and trend  - Pt w/ DNR/DNI MOLST form filled with minimal intervention - unclear what family wants

## 2022-10-08 NOTE — PROGRESS NOTE ADULT - PROBLEM SELECTOR PLAN 9
Pt has MOLST form filled out stating DNR/DNI with no dialysis or transfusions. Trial of IVF and antibiotics with minimal interventions.  - As per ED provider and ED registered nurse who had a long discussion with son and daughter, family would want the patient to receive a transfusion tonight. Family stated they were "unsure why they filled out the molst form that way".  - Attempted to reach son and daughter's contact number >15 times but was unable to reach them.  - Palliative consulted for GOC discussion and further clarification. Pt is hospice candidate.  - will continue limited medical interventions as stated in copy of molst provided by children, will give transfusion and antibiotics per family request. New MOLST to be completed in the am by primary team or palliative care when children are able to be reached.   - patient is hospice appropriate. Primary team to further clarify GOC

## 2022-10-09 NOTE — PROGRESS NOTE ADULT - SUBJECTIVE AND OBJECTIVE BOX
Date/Time Patient Seen:  		  Referring MD:   Data Reviewed	       Patient is a 89y old  Male who presents with a chief complaint of Unresponsiveness (08 Oct 2022 13:49)      Subjective/HPI     PAST MEDICAL & SURGICAL HISTORY:  No pertinent past medical history    Low blood pressure    Renal cancer    History of cholecystectomy    History of appendectomy    History of tonsillectomy          Medication list         MEDICATIONS  (STANDING):    MEDICATIONS  (PRN):  acetaminophen  Suppository .. 650 milliGRAM(s) Rectal every 6 hours PRN Temp greater or equal to 38C (100.4F), Mild Pain (1 - 3)  HYDROmorphone  Injectable 0.5 milliGRAM(s) IV Push every 90 minutes PRN pain  ondansetron Injectable 4 milliGRAM(s) IV Push every 8 hours PRN Nausea and/or Vomiting         Vitals log        ICU Vital Signs Last 24 Hrs  T(C): 35.4 (08 Oct 2022 08:39), Max: 35.4 (08 Oct 2022 08:39)  T(F): 95.8 (08 Oct 2022 08:39), Max: 95.8 (08 Oct 2022 08:39)  HR: 89 (08 Oct 2022 11:00) (89 - 98)  BP: 105/58 (08 Oct 2022 11:00) (105/58 - 136/64)  BP(mean): --  ABP: --  ABP(mean): --  RR: 19 (08 Oct 2022 11:00) (19 - 22)  SpO2: 99% (08 Oct 2022 11:00) (91% - 99%)    O2 Parameters below as of 08 Oct 2022 11:00  Patient On (Oxygen Delivery Method): mask, Venturi                 Input and Output:  I&O's Detail      Lab Data                        9.6    38.23 )-----------( 337      ( 08 Oct 2022 11:50 )             30.3     10-08    150<H>  |  113<H>  |  72<H>  ----------------------------<  5<LL>  3.4<L>   |  29  |  3.10<H>    Ca    9.5      08 Oct 2022 11:50    TPro  6.7  /  Alb  2.7<L>  /  TBili  1.4<H>  /  DBili  x   /  AST  89<H>  /  ALT  103<H>  /  AlkPhos  61  10-08      CARDIAC MARKERS ( 07 Oct 2022 21:00 )  x     / x     / 43 U/L / x     / x            Review of Systems	      Objective     Physical Examination    heart s1s2  lung dc BS  head nc      Pertinent Lab findings & Imaging      Mariee:  NO   Adequate UO     I&O's Detail           Discussed with:     Cultures:	        Radiology                            
CHIEF COMPLAINT/INTERVAL HISTORY:    Patient is a 89y old  Male who presents with a chief complaint of Unresponsiveness (08 Oct 2022 08:35)      HPI:  89 yo M with PMH end stage renal cancer presents to the ED with unresponsiveness. Pt was in normal state of health today but was found to be unresponsive this afternoon by HHA. Family was called over to the home who called EMS. No known fevers or chills. No known trauma. No known COVID exposure.    Information received from charts, attempted >15 times to contact patient's son and daughter but unable to reach.   Per ED RN daughter stated patient was going to go on hospice however daughter stated no one ever contacted her to set it up. Upon arrival in the ed daughter and son stated patient dnr/dni however they would like blood transfusion, antibiotics and fluids. Copy of MOLST provided which states no dialysis, no transfusions however daughter told ed staff she changed her mind and wanted blood transfusion.     ED Course:   Vitals: BP: 77/55, HR: 114, Temp: , RR: 22, SpO2: 88% on NRB   Labs:   WBC: 23.20, H/H: 5.3/17.6, MCV: 124.8, neut: 22.27, lactate 6.2  Na: 147, BUN/Cr: 58/3.10, GFR: 18  Alb: 2.8, T. bili: 1.8, AST: 120/ ALT: 103  Trop: 101.8, proBNP: 4,344  VBG: pH: 7.30, PO2: 38, PCO2: 49, HCO3: 24, SpO2: 55.9%  Imaging:   CXR: Congestive edema vs atypical infection as per personal read, official read pending   CT Head: No acute intracranial bleed, mass effect or acute territorial infarct.  EKG: Sinus tachy 111bpm  Received Zosyn x1, 1L NS bolus x1 in the ED  (08 Oct 2022 01:43)      SUBJECTIVE & OBJECTIVE: Pt seen and examined at bedside.   remains unresponsive  s/p PRBC early am  unable to get ROS from pt  on VM      Vital Signs Last 24 Hrs  T(C): 35.4 (08 Oct 2022 08:39), Max: 36.5 (08 Oct 2022 06:07)  T(F): 95.8 (08 Oct 2022 08:39), Max: 97.7 (08 Oct 2022 06:07)  HR: 89 (08 Oct 2022 11:00) (86 - 114)  BP: 105/58 (08 Oct 2022 11:00) (77/55 - 136/64)  BP(mean): --  ABP: --  ABP(mean): --  RR: 19 (08 Oct 2022 11:00) (19 - 24)  SpO2: 99% (08 Oct 2022 11:00) (88% - 99%)    O2 Parameters below as of 08 Oct 2022 11:00  Patient On (Oxygen Delivery Method): mask, Venturi              MEDICATIONS  (STANDING):    MEDICATIONS  (PRN):  acetaminophen  Suppository .. 650 milliGRAM(s) Rectal every 6 hours PRN Temp greater or equal to 38C (100.4F), Mild Pain (1 - 3)  morphine  - Injectable 1 milliGRAM(s) IV Push every 3 hours PRN Moderate pain (4-6), Severe pain (7-10), Respiratory rate greater than 22  ondansetron Injectable 4 milliGRAM(s) IV Push every 8 hours PRN Nausea and/or Vomiting        PHYSICAL EXAM:    GENERAL: unresponsive, comatose, malnourished, chronically ill looking   EYES: perrla, +++ pallor  NECK: Supple,  NERVOUS SYSTEM:  comatose  CHEST/LUNG: b/l air entry, no wheezing/ rales  HEART: S1, S2  ABDOMEN: Soft, Nontender,Bowel sounds present  EXTREMITIES:  no cyanosis, or edema  Skin: Dry     LABS:                        9.6    38.23 )-----------( 337      ( 08 Oct 2022 11:50 )             30.3     10-08    150<H>  |  113<H>  |  72<H>  ----------------------------<  5<LL>  3.4<L>   |  29  |  3.10<H>    Ca    9.5      08 Oct 2022 11:50    TPro  6.7  /  Alb  2.7<L>  /  TBili  1.4<H>  /  DBili  x   /  AST  89<H>  /  ALT  103<H>  /  AlkPhos  61  10-08    PT/INR - ( 07 Oct 2022 21:00 )   PT: 16.7 sec;   INR: 1.42 ratio         PTT - ( 07 Oct 2022 21:00 )  PTT:31.0 sec    < from: CT Head No Cont (10.07.22 @ 23:38) >    ACC: [id number] EXAM:  CT BRAIN                          PROCEDURE DATE:  10/07/2022          INTERPRETATION:  CLINICAL INFORMATION:  ams today    TECHNIQUE:  Axial CT images were acquired through the head.  Intravenous contrast: None  Two-dimensional reformats were generated.    COMPARISON STUDY: CT brain 10/19/2021.    FINDINGS: The ventricles and cortical sulci prominent, consistent with   mild/moderate diffuse cerebral atrophy. Cerebellar volume loss is seen as   well. There is mild chronic ventricular white matter small vessel   ischemic disease. No acute intracranial bleed, extra-axial fluid   collection, mass effect, midline shift, hydrocephalus, acute territorial   infarct or depressed skull fracture evident. There is calcific   atherosclerosis of bilateral cavernous ICAs and bilateral distal   vertebral arteries. The imaged orbits demonstrate bilateral scleral   calcifications. The imaged paranasal sinuses demonstrate mild right   sphenoid sinus mucosal thickening. Imaged mastoidair cells are clear.    IMPRESSION: No acute intracranial bleed, mass effect or acute territorial   infarct.    --- End of Report ---            ANNE-MARIE PARR MD; Attending Radiologist  This document has been electronically signed. Oct  8 2022 12:26AM    < end of copied text >

## 2022-10-09 NOTE — CHART NOTE - NSCHARTNOTEFT_GEN_A_CORE
Called to see the patient for unresponsiveness.     On the exam. Did not respond to verbal or physical stimuli. Absent heart and breath sounds. Absent peripheral pulses. Pupils are fixed and dilated. Patient pronounced death at 7:40 am  Dr. Laguerre  notified. Daughter Shira notified. Autopsy declined.

## 2022-10-09 NOTE — PROGRESS NOTE ADULT - ASSESSMENT
89-year-old male with history of end-stage renal cancer presents with brought in by EMS for altered mental status    ams  hypoxemia  renal ca  mets ca  velasquez  ckd    on o2 support - wean as tolerated - keep sat > 88 pct    will change Morphine to Dilaudid in pt with renal failure    pt is DNR DNI    VS noted - labs reviewed - imaging noted -     prognosis poor - pt is hospice appropriate    H and P - ER provider note - reviewed
89 yo M with PMH end stage renal cancer presents to the ED with unresponsiveness admitted for sepsis and unresponsiveness.pt was supposed to be started on home hospice but never rec'd phone call to set up home hospice services.     leukocytosis/ leukemoid reac  ?etiology  infection vs malignancy  started initially on IV abx- now family request for CCO- comfort measures, no aggressive intervention, stop abx    end stage RCC- start comfort care per daughter     hypoxia/ encephalopathy  likely sec to advanced malignancy  Oxygen   supportive care     severe anemia  s/p PRBC early am  hb improved  daughter initially requested for PRBc and he rec'd it, now she wants to follow pt wishes and request CCO    severe hypoglycemia - s/p IV Dextrose  daughter wants to start comfort measures- no further aggressive interventions- no further blood draws or tests per daughter request.    HERB likely sec to poor oral inake and malignancy    Poor prognosis      I had long discussion w/ daughter Carl in am over phone and now by bed side in person, daughter request only comfort measures, no aggressive interventions, no blood draws, no further transfusions, no needle sticks, no abx, no IVF, no feeding tube, pt is dnr/ dni per MOLST form - daughter verifies it  IV morphine prn ordered as per daughter request , currently not in any discomfort or resp. distress  will start comfort measures  Oxygen via VM  seen by Pulm in am   palliative care eval   will provide comfort measures as requested by daughter Carl (per carl she is HCp) and brother will be here soon to see pt.

## 2022-10-13 LAB
CULTURE RESULTS: SIGNIFICANT CHANGE UP
CULTURE RESULTS: SIGNIFICANT CHANGE UP
SPECIMEN SOURCE: SIGNIFICANT CHANGE UP
SPECIMEN SOURCE: SIGNIFICANT CHANGE UP

## 2022-10-14 NOTE — DISCHARGE NOTE FOR THE EXPIRED PATIENT - HOSPITAL COURSE
Based upon poor prognosis patient was placed on comfort measures only protocol and  later in the hospital

## 2022-10-20 PROCEDURE — 93005 ELECTROCARDIOGRAM TRACING: CPT

## 2022-10-20 PROCEDURE — 99285 EMERGENCY DEPT VISIT HI MDM: CPT

## 2022-10-20 PROCEDURE — 36430 TRANSFUSION BLD/BLD COMPNT: CPT

## 2022-10-20 PROCEDURE — 85610 PROTHROMBIN TIME: CPT

## 2022-10-20 PROCEDURE — 83540 ASSAY OF IRON: CPT

## 2022-10-20 PROCEDURE — 83605 ASSAY OF LACTIC ACID: CPT

## 2022-10-20 PROCEDURE — 85025 COMPLETE CBC W/AUTO DIFF WBC: CPT

## 2022-10-20 PROCEDURE — 83550 IRON BINDING TEST: CPT

## 2022-10-20 PROCEDURE — 86901 BLOOD TYPING SEROLOGIC RH(D): CPT

## 2022-10-20 PROCEDURE — 84443 ASSAY THYROID STIM HORMONE: CPT

## 2022-10-20 PROCEDURE — 86850 RBC ANTIBODY SCREEN: CPT

## 2022-10-20 PROCEDURE — 96365 THER/PROPH/DIAG IV INF INIT: CPT

## 2022-10-20 PROCEDURE — 82607 VITAMIN B-12: CPT

## 2022-10-20 PROCEDURE — 36415 COLL VENOUS BLD VENIPUNCTURE: CPT

## 2022-10-20 PROCEDURE — 82550 ASSAY OF CK (CPK): CPT

## 2022-10-20 PROCEDURE — 87040 BLOOD CULTURE FOR BACTERIA: CPT

## 2022-10-20 PROCEDURE — 83880 ASSAY OF NATRIURETIC PEPTIDE: CPT

## 2022-10-20 PROCEDURE — 82803 BLOOD GASES ANY COMBINATION: CPT

## 2022-10-20 PROCEDURE — 83036 HEMOGLOBIN GLYCOSYLATED A1C: CPT

## 2022-10-20 PROCEDURE — 80053 COMPREHEN METABOLIC PANEL: CPT

## 2022-10-20 PROCEDURE — 0225U NFCT DS DNA&RNA 21 SARSCOV2: CPT

## 2022-10-20 PROCEDURE — 86900 BLOOD TYPING SEROLOGIC ABO: CPT

## 2022-10-20 PROCEDURE — 82746 ASSAY OF FOLIC ACID SERUM: CPT

## 2022-10-20 PROCEDURE — P9016: CPT

## 2022-10-20 PROCEDURE — 86923 COMPATIBILITY TEST ELECTRIC: CPT

## 2022-10-20 PROCEDURE — 84484 ASSAY OF TROPONIN QUANT: CPT

## 2022-10-20 PROCEDURE — 70450 CT HEAD/BRAIN W/O DYE: CPT | Mod: MA

## 2022-10-20 PROCEDURE — 85730 THROMBOPLASTIN TIME PARTIAL: CPT

## 2022-10-20 PROCEDURE — 84466 ASSAY OF TRANSFERRIN: CPT

## 2022-10-20 PROCEDURE — 71045 X-RAY EXAM CHEST 1 VIEW: CPT
